# Patient Record
Sex: FEMALE | Race: WHITE | NOT HISPANIC OR LATINO
[De-identification: names, ages, dates, MRNs, and addresses within clinical notes are randomized per-mention and may not be internally consistent; named-entity substitution may affect disease eponyms.]

---

## 2019-01-25 PROBLEM — Z00.00 ENCOUNTER FOR PREVENTIVE HEALTH EXAMINATION: Status: ACTIVE | Noted: 2019-01-25

## 2019-03-04 ENCOUNTER — APPOINTMENT (OUTPATIENT)
Age: 69
End: 2019-03-04

## 2019-03-25 ENCOUNTER — EMERGENCY (EMERGENCY)
Facility: HOSPITAL | Age: 69
LOS: 0 days | Discharge: HOME | End: 2019-03-25
Attending: EMERGENCY MEDICINE | Admitting: EMERGENCY MEDICINE

## 2019-03-25 VITALS
TEMPERATURE: 97 F | OXYGEN SATURATION: 96 % | HEART RATE: 73 BPM | DIASTOLIC BLOOD PRESSURE: 80 MMHG | SYSTOLIC BLOOD PRESSURE: 129 MMHG | RESPIRATION RATE: 20 BRPM

## 2019-03-25 DIAGNOSIS — R05 COUGH: ICD-10-CM

## 2019-03-25 DIAGNOSIS — E86.0 DEHYDRATION: ICD-10-CM

## 2019-03-25 DIAGNOSIS — R53.1 WEAKNESS: ICD-10-CM

## 2019-03-25 DIAGNOSIS — M79.10 MYALGIA, UNSPECIFIED SITE: ICD-10-CM

## 2019-03-25 LAB
ALBUMIN SERPL ELPH-MCNC: 4 G/DL — SIGNIFICANT CHANGE UP (ref 3.5–5.2)
ALP SERPL-CCNC: 102 U/L — SIGNIFICANT CHANGE UP (ref 30–115)
ALT FLD-CCNC: 11 U/L — SIGNIFICANT CHANGE UP (ref 0–41)
ANION GAP SERPL CALC-SCNC: 13 MMOL/L — SIGNIFICANT CHANGE UP (ref 7–14)
AST SERPL-CCNC: 14 U/L — SIGNIFICANT CHANGE UP (ref 0–41)
BASOPHILS # BLD AUTO: 0.03 K/UL — SIGNIFICANT CHANGE UP (ref 0–0.2)
BASOPHILS NFR BLD AUTO: 0.5 % — SIGNIFICANT CHANGE UP (ref 0–1)
BILIRUB SERPL-MCNC: 0.4 MG/DL — SIGNIFICANT CHANGE UP (ref 0.2–1.2)
BUN SERPL-MCNC: 26 MG/DL — HIGH (ref 10–20)
CALCIUM SERPL-MCNC: 9.5 MG/DL — SIGNIFICANT CHANGE UP (ref 8.5–10.1)
CHLORIDE SERPL-SCNC: 104 MMOL/L — SIGNIFICANT CHANGE UP (ref 98–110)
CO2 SERPL-SCNC: 23 MMOL/L — SIGNIFICANT CHANGE UP (ref 17–32)
CREAT SERPL-MCNC: 0.9 MG/DL — SIGNIFICANT CHANGE UP (ref 0.7–1.5)
EOSINOPHIL # BLD AUTO: 0.11 K/UL — SIGNIFICANT CHANGE UP (ref 0–0.7)
EOSINOPHIL NFR BLD AUTO: 1.8 % — SIGNIFICANT CHANGE UP (ref 0–8)
GLUCOSE SERPL-MCNC: 88 MG/DL — SIGNIFICANT CHANGE UP (ref 70–99)
HCT VFR BLD CALC: 39.6 % — SIGNIFICANT CHANGE UP (ref 37–47)
HGB BLD-MCNC: 13.2 G/DL — SIGNIFICANT CHANGE UP (ref 12–16)
IMM GRANULOCYTES NFR BLD AUTO: 0.2 % — SIGNIFICANT CHANGE UP (ref 0.1–0.3)
LACTATE SERPL-SCNC: 0.8 MMOL/L — SIGNIFICANT CHANGE UP (ref 0.5–2.2)
LIDOCAIN IGE QN: 54 U/L — SIGNIFICANT CHANGE UP (ref 7–60)
LYMPHOCYTES # BLD AUTO: 2.79 K/UL — SIGNIFICANT CHANGE UP (ref 1.2–3.4)
LYMPHOCYTES # BLD AUTO: 44.7 % — SIGNIFICANT CHANGE UP (ref 20.5–51.1)
MAGNESIUM SERPL-MCNC: 2.4 MG/DL — SIGNIFICANT CHANGE UP (ref 1.8–2.4)
MCHC RBC-ENTMCNC: 30.8 PG — SIGNIFICANT CHANGE UP (ref 27–31)
MCHC RBC-ENTMCNC: 33.3 G/DL — SIGNIFICANT CHANGE UP (ref 32–37)
MCV RBC AUTO: 92.3 FL — SIGNIFICANT CHANGE UP (ref 81–99)
MONOCYTES # BLD AUTO: 0.5 K/UL — SIGNIFICANT CHANGE UP (ref 0.1–0.6)
MONOCYTES NFR BLD AUTO: 8 % — SIGNIFICANT CHANGE UP (ref 1.7–9.3)
NEUTROPHILS # BLD AUTO: 2.8 K/UL — SIGNIFICANT CHANGE UP (ref 1.4–6.5)
NEUTROPHILS NFR BLD AUTO: 44.8 % — SIGNIFICANT CHANGE UP (ref 42.2–75.2)
NRBC # BLD: 0 /100 WBCS — SIGNIFICANT CHANGE UP (ref 0–0)
PHOSPHATE SERPL-MCNC: 3.5 MG/DL — SIGNIFICANT CHANGE UP (ref 2.1–4.9)
PLATELET # BLD AUTO: 237 K/UL — SIGNIFICANT CHANGE UP (ref 130–400)
POTASSIUM SERPL-MCNC: 4.2 MMOL/L — SIGNIFICANT CHANGE UP (ref 3.5–5)
POTASSIUM SERPL-SCNC: 4.2 MMOL/L — SIGNIFICANT CHANGE UP (ref 3.5–5)
PROT SERPL-MCNC: 7.1 G/DL — SIGNIFICANT CHANGE UP (ref 6–8)
RBC # BLD: 4.29 M/UL — SIGNIFICANT CHANGE UP (ref 4.2–5.4)
RBC # FLD: 13.1 % — SIGNIFICANT CHANGE UP (ref 11.5–14.5)
SODIUM SERPL-SCNC: 140 MMOL/L — SIGNIFICANT CHANGE UP (ref 135–146)
TROPONIN T SERPL-MCNC: <0.01 NG/ML — SIGNIFICANT CHANGE UP
WBC # BLD: 6.24 K/UL — SIGNIFICANT CHANGE UP (ref 4.8–10.8)
WBC # FLD AUTO: 6.24 K/UL — SIGNIFICANT CHANGE UP (ref 4.8–10.8)

## 2019-03-25 RX ORDER — SODIUM CHLORIDE 9 MG/ML
1000 INJECTION INTRAMUSCULAR; INTRAVENOUS; SUBCUTANEOUS ONCE
Qty: 0 | Refills: 0 | Status: COMPLETED | OUTPATIENT
Start: 2019-03-25 | End: 2019-03-25

## 2019-03-25 RX ORDER — FAMOTIDINE 10 MG/ML
20 INJECTION INTRAVENOUS ONCE
Qty: 0 | Refills: 0 | Status: COMPLETED | OUTPATIENT
Start: 2019-03-25 | End: 2019-03-25

## 2019-03-25 RX ADMIN — FAMOTIDINE 20 MILLIGRAM(S): 10 INJECTION INTRAVENOUS at 19:46

## 2019-03-25 RX ADMIN — SODIUM CHLORIDE 2000 MILLILITER(S): 9 INJECTION INTRAMUSCULAR; INTRAVENOUS; SUBCUTANEOUS at 19:46

## 2019-03-25 NOTE — ED PROVIDER NOTE - OBJECTIVE STATEMENT
68 female here for feeling unwell over several days.     Symptoms started after taking antibiotics tetracycline for H. pylori infection found by breath test and EGD as outpatient.     Symptoms are non specific, include cough and malaise, nausea but no vomiting. Unrelenting, not worsening, and not associated with any constituional symptoms.

## 2019-03-25 NOTE — ED ADULT NURSE NOTE - NSIMPLEMENTINTERV_GEN_ALL_ED
Implemented All Universal Safety Interventions:  [place] to call system. Call bell, personal items and telephone within reach. Instruct patient to call for assistance. Room bathroom lighting operational. Non-slip footwear when patient is off stretcher. Physically safe environment: no spills, clutter or unnecessary equipment. Stretcher in lowest position, wheels locked, appropriate side rails in place.

## 2019-03-25 NOTE — ED PROVIDER NOTE - CLINICAL SUMMARY MEDICAL DECISION MAKING FREE TEXT BOX
68 female here for evaluation of weakness, malaise, cough.  On ABX for +  H pylori test / culture recently. Advised by PMD to stop ABX. No diarrhea. Had screening labs and imaging with plan for outpatient care. Pt improved clinically in ED with supportive care.

## 2020-03-17 NOTE — ED ADULT NURSE NOTE - LANGUAGE ASSISTANCE NEEDED
----- Message from Matti Garcia sent at 3/17/2020 10:13 AM CDT -----  Contact: Patient @ [phone]  Patient requesting a return call to discuss concerns with constipation, pls call    No-Patient/Caregiver offered and refused free interpretation services.

## 2020-03-31 ENCOUNTER — EMERGENCY (EMERGENCY)
Facility: HOSPITAL | Age: 70
LOS: 0 days | Discharge: HOME | End: 2020-03-31
Attending: EMERGENCY MEDICINE | Admitting: EMERGENCY MEDICINE
Payer: MEDICAID

## 2020-03-31 VITALS
SYSTOLIC BLOOD PRESSURE: 114 MMHG | RESPIRATION RATE: 20 BRPM | OXYGEN SATURATION: 95 % | DIASTOLIC BLOOD PRESSURE: 75 MMHG | HEART RATE: 96 BPM | TEMPERATURE: 100 F

## 2020-03-31 VITALS
TEMPERATURE: 101 F | DIASTOLIC BLOOD PRESSURE: 60 MMHG | HEART RATE: 96 BPM | RESPIRATION RATE: 19 BRPM | OXYGEN SATURATION: 95 % | SYSTOLIC BLOOD PRESSURE: 110 MMHG

## 2020-03-31 DIAGNOSIS — J18.9 PNEUMONIA, UNSPECIFIED ORGANISM: ICD-10-CM

## 2020-03-31 DIAGNOSIS — R50.9 FEVER, UNSPECIFIED: ICD-10-CM

## 2020-03-31 DIAGNOSIS — B34.9 VIRAL INFECTION, UNSPECIFIED: ICD-10-CM

## 2020-03-31 LAB
ALBUMIN SERPL ELPH-MCNC: 4.5 G/DL — SIGNIFICANT CHANGE UP (ref 3.5–5.2)
ALP SERPL-CCNC: 60 U/L — SIGNIFICANT CHANGE UP (ref 30–115)
ALT FLD-CCNC: 14 U/L — SIGNIFICANT CHANGE UP (ref 0–41)
ANION GAP SERPL CALC-SCNC: 12 MMOL/L — SIGNIFICANT CHANGE UP (ref 7–14)
AST SERPL-CCNC: 19 U/L — SIGNIFICANT CHANGE UP (ref 0–41)
BASOPHILS # BLD AUTO: 0.01 K/UL — SIGNIFICANT CHANGE UP (ref 0–0.2)
BASOPHILS NFR BLD AUTO: 0.2 % — SIGNIFICANT CHANGE UP (ref 0–1)
BILIRUB SERPL-MCNC: 0.2 MG/DL — SIGNIFICANT CHANGE UP (ref 0.2–1.2)
BUN SERPL-MCNC: 26 MG/DL — HIGH (ref 10–20)
CALCIUM SERPL-MCNC: 9.2 MG/DL — SIGNIFICANT CHANGE UP (ref 8.5–10.1)
CHLORIDE SERPL-SCNC: 96 MMOL/L — LOW (ref 98–110)
CO2 SERPL-SCNC: 26 MMOL/L — SIGNIFICANT CHANGE UP (ref 17–32)
CREAT SERPL-MCNC: 1.2 MG/DL — SIGNIFICANT CHANGE UP (ref 0.7–1.5)
EOSINOPHIL # BLD AUTO: 0 K/UL — SIGNIFICANT CHANGE UP (ref 0–0.7)
EOSINOPHIL NFR BLD AUTO: 0 % — SIGNIFICANT CHANGE UP (ref 0–8)
GLUCOSE SERPL-MCNC: 112 MG/DL — HIGH (ref 70–99)
HCT VFR BLD CALC: 41.5 % — SIGNIFICANT CHANGE UP (ref 37–47)
HGB BLD-MCNC: 13.7 G/DL — SIGNIFICANT CHANGE UP (ref 12–16)
IMM GRANULOCYTES NFR BLD AUTO: 0.3 % — SIGNIFICANT CHANGE UP (ref 0.1–0.3)
LYMPHOCYTES # BLD AUTO: 1.9 K/UL — SIGNIFICANT CHANGE UP (ref 1.2–3.4)
LYMPHOCYTES # BLD AUTO: 29.9 % — SIGNIFICANT CHANGE UP (ref 20.5–51.1)
MAGNESIUM SERPL-MCNC: 2.4 MG/DL — SIGNIFICANT CHANGE UP (ref 1.8–2.4)
MCHC RBC-ENTMCNC: 30.9 PG — SIGNIFICANT CHANGE UP (ref 27–31)
MCHC RBC-ENTMCNC: 33 G/DL — SIGNIFICANT CHANGE UP (ref 32–37)
MCV RBC AUTO: 93.7 FL — SIGNIFICANT CHANGE UP (ref 81–99)
MONOCYTES # BLD AUTO: 0.51 K/UL — SIGNIFICANT CHANGE UP (ref 0.1–0.6)
MONOCYTES NFR BLD AUTO: 8 % — SIGNIFICANT CHANGE UP (ref 1.7–9.3)
NEUTROPHILS # BLD AUTO: 3.91 K/UL — SIGNIFICANT CHANGE UP (ref 1.4–6.5)
NEUTROPHILS NFR BLD AUTO: 61.6 % — SIGNIFICANT CHANGE UP (ref 42.2–75.2)
NRBC # BLD: 0 /100 WBCS — SIGNIFICANT CHANGE UP (ref 0–0)
PLATELET # BLD AUTO: 229 K/UL — SIGNIFICANT CHANGE UP (ref 130–400)
POTASSIUM SERPL-MCNC: 5.3 MMOL/L — HIGH (ref 3.5–5)
POTASSIUM SERPL-SCNC: 5.3 MMOL/L — HIGH (ref 3.5–5)
PROT SERPL-MCNC: 8.1 G/DL — HIGH (ref 6–8)
RBC # BLD: 4.43 M/UL — SIGNIFICANT CHANGE UP (ref 4.2–5.4)
RBC # FLD: 13 % — SIGNIFICANT CHANGE UP (ref 11.5–14.5)
SODIUM SERPL-SCNC: 134 MMOL/L — LOW (ref 135–146)
TROPONIN T SERPL-MCNC: <0.01 NG/ML — SIGNIFICANT CHANGE UP
WBC # BLD: 6.35 K/UL — SIGNIFICANT CHANGE UP (ref 4.8–10.8)
WBC # FLD AUTO: 6.35 K/UL — SIGNIFICANT CHANGE UP (ref 4.8–10.8)

## 2020-03-31 PROCEDURE — 71045 X-RAY EXAM CHEST 1 VIEW: CPT | Mod: 26

## 2020-03-31 PROCEDURE — 93010 ELECTROCARDIOGRAM REPORT: CPT

## 2020-03-31 PROCEDURE — 99285 EMERGENCY DEPT VISIT HI MDM: CPT

## 2020-03-31 RX ORDER — ACETAMINOPHEN 500 MG
975 TABLET ORAL ONCE
Refills: 0 | Status: COMPLETED | OUTPATIENT
Start: 2020-03-31 | End: 2020-03-31

## 2020-03-31 RX ADMIN — Medication 975 MILLIGRAM(S): at 20:20

## 2020-03-31 NOTE — ED PROVIDER NOTE - NS ED ROS FT
Review of Systems  Constitutional:  (+) fever, chills.  Eyes:  No visual changes, eye pain, or discharge.  ENMT:  No hearing changes, pain, or discharge. No nasal congestion, discharge, or bleeding. No throat pain, swelling, or difficulty swallowing.  Cardiac:  No chest pain, palpitations, syncope, or edema.  Respiratory:  (+) cough, SOB. No hemoptysis.  GI:  No nausea, vomiting, diarrhea, or abdominal pain.   :  No dysuria, hematuria, frequency, or burning.   MS:  No back pain.  Skin:  No skin rash, pruritis, jaundice, or lesions.  Neuro:  No headache, dizziness, loss of sensation, or focal weakness.  No change in mental status.   Endocrine: No history of thyroid disease or diabetes.

## 2020-03-31 NOTE — ED PROVIDER NOTE - PHYSICAL EXAMINATION
VITAL SIGNS: I have reviewed nursing notes and confirm.  CONSTITUTIONAL: Well-developed; well-nourished; in no acute distress.  SKIN: Skin exam is warm and dry, no acute rash.  HEAD: Normocephalic; atraumatic.  EYES: Conjunctiva and sclera clear.  ENT: No nasal discharge; airway clear.   NECK: Supple; non tender.  CARD: S1, S2 normal; no murmurs, gallops, or rubs. Regular rate and rhythm.  RESP: No wheezes, rales or rhonchi. Speaking in full sentences.   ABD: Normal bowel sounds; soft; non-distended; non-tender; No rebound or guarding.  EXT: Normal ROM. No clubbing, cyanosis or edema.  NEURO: Alert, oriented. Grossly unremarkable. No focal deficits.

## 2020-03-31 NOTE — ED PROVIDER NOTE - NSFOLLOWUPINSTRUCTIONS_ED_ALL_ED_FT
You are being discharged with viral illness diagnosis and do not require hospitalization.  At this time, only patients who are being hospitalized are tested for COVID-19.    If you are well enough to be discharged home and are not in a high risk group to be admitted, you should care for yourself at home exactly like you would if you have Influenza “flu”. Follow all the standard guidelines about washing your hands, covering your cough, etc. If you feel unwell, stay home, rest and drink plenty of clear fluids. Keep track of your symptoms. You should return to the Emergency Department if you develop worse symptoms, trouble breathing, chest pain, and/or a fever that doesn’t improve with over the counter medications.    Please consider going through the drive-through testing unless you are severely ill and need to go to the ED.  -through testing is available at various location, including [place].  Call [facility] at  [phone] to make an appointment.    How to Set Up Your Home for Self-Quarantine or Self-Isolation    Please refer to this helpful video.   [email or web address]    Fever    A fever is an increase in the body's temperature above 100.4°F (38°C) or higher. In adults and children older than three months, a brief mild or moderate fever generally has no long-term effect, and it usually does not require treatment. Many times, fevers are the result of viral infections, which are self-resolving.  However, certain symptoms or diagnostic tests may suggest a bacterial infection that may respond to antibiotics. Take medications as directed by your health care provider.    SEEK IMMEDIATE MEDICAL CARE IF YOU OR YOUR CHILD HAVE ANY OF THE FOLLOWING SYMPTOMS : shortness of breath, seizure, rash/stiff neck/headache, severe abdominal pain, persistent vomiting, any signs of dehydration, or if your child has a fever for over five (5) days.    Cough    Coughing is a reflex that clears your throat and your airways. Coughing helps to heal and protect your lungs. It is normal to cough occasionally, but a cough that happens with other symptoms or lasts a long time may be a sign of a condition that needs treatment. Coughing may be caused by infections, asthma or COPD, smoking, postnasal drip, gastroesophageal reflux, as well as other medical conditions. Take medicines only as instructed by your health care provider. Avoid environments or triggers that causes you to cough at work or at home.    SEEK IMMEDIATE MEDICAL CARE IF YOU HAVE ANY OF THE FOLLOWING SYMPTOMS: coughing up blood, shortness of breath, rapid heart rate, chest pain, unexplained weight loss or night sweats.    Fever    A fever is an increase in the body's temperature above 100.4°F (38°C) or higher. In adults and children older than three months, a brief mild or moderate fever generally has no long-term effect, and it usually does not require treatment. Many times, fevers are the result of viral infections, which are self-resolving.  However, certain symptoms or diagnostic tests may suggest a bacterial infection that may respond to antibiotics. Take medications as directed by your health care provider.    SEEK IMMEDIATE MEDICAL CARE IF YOU OR YOUR CHILD HAVE ANY OF THE FOLLOWING SYMPTOMS : shortness of breath, seizure, rash/stiff neck/headache, severe abdominal pain, persistent vomiting, any signs of dehydration, or if your child has a fever for over five (5) days.    Pneumonia    Pneumonia is an infection of the lungs. Pneumonia may be caused by bacteria, viruses, or funguses. Symptoms include coughing, fever, chest pain when breathing deeply or coughing, shortness of breath, fatigue, or muscle aches. Pneumonia can be diagnosed with a medical history and physical exam, as well as other tests which may include a chest X-ray. If you were prescribed an antibiotic medicine, take it as told by your health care provider and do not stop taking the antibiotic even if you start to feel better. Do not use tobacco products, including cigarettes, chewing tobacco, and e-cigarettes.    SEEK IMMEDIATE MEDICAL CARE IF YOU HAVE ANY OF THE FOLLOWING SYMPTOMS: worsening shortness of breath, worsening chest pain, coughing up blood, change in mental status, lightheadedness/dizziness.

## 2020-03-31 NOTE — ED PROVIDER NOTE - ATTENDING CONTRIBUTION TO CARE
69 year old female, speaks Turkish, case discussed using translation services, comes in with uri symptoms, no cp, + mild exertional sob, no n/v/d, no loc, + subjective fever at home    CONSTITUTIONAL: Well-developed; well-nourished; in no acute distress. Sitting up and providing appropriate history and physical examination  SKIN: skin exam is warm and dry, no acute rash.  HEAD: Normocephalic; atraumatic.  EYES: PERRL, 3 mm bilateral, no nystagmus, EOM intact; conjunctiva and sclera clear.  ENT: + Pharyngeal erythema, no exudate, no edema, no pooling of secretions, No nasal discharge; airway clear.  NECK: Supple; non tender. + full passive ROM in all directions. No JVD  CARD: S1, S2 normal; no murmurs, gallops, or rubs. Regular rate and rhythm. + Symmetric Strong Pulses  RESP: No wheezes, rales or rhonchi. Good air movement bilaterally  ABD: soft; non-distended; non-tender. No Rebound, No Guarding, No signs of peritonitis, No CVA tenderness. No pulsatile abdominal mass. + Strong and Symmetric Pulses  EXT: Normal ROM. No clubbing, cyanosis or edema. Dp and Pt Pulses intact. Cap refill less than 3 seconds  NEURO: Alert, oriented, grossly unremarkable. No Focal deficits. GCS 15. NIH 0  PSYCH: Cooperative, appropriate.

## 2020-03-31 NOTE — ED PROVIDER NOTE - CLINICAL SUMMARY MEDICAL DECISION MAKING FREE TEXT BOX
I personally evaluated the patient. I reviewed the Resident’s or Physician Assistant’s note (as assigned above), and agree with the findings and plan except as documented in my note. case discussed with patient and daughter, would like to go home, given detailed return precautions, patient not tachypneic and not having labored breathing

## 2020-03-31 NOTE — ED PROVIDER NOTE - OBJECTIVE STATEMENT
70 yo F with PMHx of HTN, HLD, and GERD presents ot the ED c/o fever, chills, non-productive cough and mild SOB x 4 days. Pt presented today because SOB worsened slightly. Pt's family at home are sick at home with the same symptoms. Pt has been taking tylenol with good relief of fever. Pt denies aggravating factors. She denies other complaints. Pt denies nausea, vomiting, abdominal pain, diarrhea, headache, dizziness, weakness, chest pain, back pain, LOC, trauma, urinary symptoms, calf pain/swelling, recent travel, recent surgery.

## 2020-03-31 NOTE — ED PROVIDER NOTE - PATIENT PORTAL LINK FT
You can access the FollowMyHealth Patient Portal offered by [facility] by registering at the following website: [email or web address]. By joining [employer]’s FollowMyHealth portal, you will also be able to view your health information using other applications (apps) compatible with our system.

## 2020-04-01 ENCOUNTER — EMERGENCY (EMERGENCY)
Facility: HOSPITAL | Age: 70
LOS: 0 days | Discharge: HOME | End: 2020-04-02
Attending: EMERGENCY MEDICINE | Admitting: EMERGENCY MEDICINE
Payer: MEDICAID

## 2020-04-01 VITALS
SYSTOLIC BLOOD PRESSURE: 119 MMHG | RESPIRATION RATE: 20 BRPM | HEIGHT: 62 IN | TEMPERATURE: 103 F | DIASTOLIC BLOOD PRESSURE: 65 MMHG | HEART RATE: 99 BPM | WEIGHT: 160.06 LBS | OXYGEN SATURATION: 97 %

## 2020-04-01 DIAGNOSIS — U07.1 COVID-19: ICD-10-CM

## 2020-04-01 DIAGNOSIS — R50.9 FEVER, UNSPECIFIED: ICD-10-CM

## 2020-04-01 DIAGNOSIS — R11.2 NAUSEA WITH VOMITING, UNSPECIFIED: ICD-10-CM

## 2020-04-01 DIAGNOSIS — R06.02 SHORTNESS OF BREATH: ICD-10-CM

## 2020-04-01 DIAGNOSIS — R05 COUGH: ICD-10-CM

## 2020-04-01 DIAGNOSIS — R11.10 VOMITING, UNSPECIFIED: ICD-10-CM

## 2020-04-01 LAB
BASOPHILS # BLD AUTO: 0 K/UL — SIGNIFICANT CHANGE UP (ref 0–0.2)
BASOPHILS NFR BLD AUTO: 0 % — SIGNIFICANT CHANGE UP (ref 0–1)
EOSINOPHIL # BLD AUTO: 0 K/UL — SIGNIFICANT CHANGE UP (ref 0–0.7)
EOSINOPHIL NFR BLD AUTO: 0 % — SIGNIFICANT CHANGE UP (ref 0–8)
HCT VFR BLD CALC: 41.5 % — SIGNIFICANT CHANGE UP (ref 37–47)
HGB BLD-MCNC: 14.2 G/DL — SIGNIFICANT CHANGE UP (ref 12–16)
IMM GRANULOCYTES NFR BLD AUTO: 0.5 % — HIGH (ref 0.1–0.3)
LYMPHOCYTES # BLD AUTO: 0.92 K/UL — LOW (ref 1.2–3.4)
LYMPHOCYTES # BLD AUTO: 16.3 % — LOW (ref 20.5–51.1)
MCHC RBC-ENTMCNC: 32.3 PG — HIGH (ref 27–31)
MCHC RBC-ENTMCNC: 34.2 G/DL — SIGNIFICANT CHANGE UP (ref 32–37)
MCV RBC AUTO: 94.5 FL — SIGNIFICANT CHANGE UP (ref 81–99)
MONOCYTES # BLD AUTO: 0.44 K/UL — SIGNIFICANT CHANGE UP (ref 0.1–0.6)
MONOCYTES NFR BLD AUTO: 7.8 % — SIGNIFICANT CHANGE UP (ref 1.7–9.3)
NEUTROPHILS # BLD AUTO: 4.25 K/UL — SIGNIFICANT CHANGE UP (ref 1.4–6.5)
NEUTROPHILS NFR BLD AUTO: 75.4 % — HIGH (ref 42.2–75.2)
NRBC # BLD: 0 /100 WBCS — SIGNIFICANT CHANGE UP (ref 0–0)
PLATELET # BLD AUTO: 186 K/UL — SIGNIFICANT CHANGE UP (ref 130–400)
RBC # BLD: 4.39 M/UL — SIGNIFICANT CHANGE UP (ref 4.2–5.4)
RBC # FLD: 13 % — SIGNIFICANT CHANGE UP (ref 11.5–14.5)
SARS-COV-2 RNA SPEC QL NAA+PROBE: DETECTED
WBC # BLD: 5.64 K/UL — SIGNIFICANT CHANGE UP (ref 4.8–10.8)
WBC # FLD AUTO: 5.64 K/UL — SIGNIFICANT CHANGE UP (ref 4.8–10.8)

## 2020-04-01 PROCEDURE — 99285 EMERGENCY DEPT VISIT HI MDM: CPT

## 2020-04-01 RX ORDER — ACETAMINOPHEN 500 MG
650 TABLET ORAL ONCE
Refills: 0 | Status: COMPLETED | OUTPATIENT
Start: 2020-04-01 | End: 2020-04-01

## 2020-04-01 RX ORDER — ONDANSETRON 8 MG/1
4 TABLET, FILM COATED ORAL ONCE
Refills: 0 | Status: COMPLETED | OUTPATIENT
Start: 2020-04-01 | End: 2020-04-01

## 2020-04-01 RX ADMIN — Medication 650 MILLIGRAM(S): at 23:06

## 2020-04-01 RX ADMIN — ONDANSETRON 4 MILLIGRAM(S): 8 TABLET, FILM COATED ORAL at 23:06

## 2020-04-01 NOTE — ED PROVIDER NOTE - NSFOLLOWUPINSTRUCTIONS_ED_ALL_ED_FT
You are being discharged with viral illness diagnosis and do not require hospitalization.  At this time, only patients who are being hospitalized are tested for COVID-19.    If you are well enough to be discharged home and are not in a high risk group to be admitted, you should care for yourself at home exactly like you would if you have Influenza “flu”. Follow all the standard guidelines about washing your hands, covering your cough, etc. If you feel unwell, stay home, rest and drink plenty of clear fluids. Keep track of your symptoms. You should return to the Emergency Department if you develop worse symptoms, trouble breathing, chest pain, and/or a fever that doesn’t improve with over the counter medications.    Please consider going through the drive-through testing unless you are severely ill and need to go to the ED.  -through testing is available at various location, including [place].  Call [facility] at  [phone] to make an appointment.    How to Set Up Your Home for Self-Quarantine or Self-Isolation    Please refer to this helpful video.   [email or web address] You were seen today for symptoms due to coronavirus.    You should care for yourself at home exactly like you would if you have Influenza “flu”. Follow all the standard guidelines about washing your hands, covering your cough, etc. If you feel unwell, stay home, rest and drink plenty of clear fluids. Keep track of your symptoms. You should return to the Emergency Department if you develop worse symptoms, trouble breathing, chest pain, and/or a fever that doesn’t improve with over the counter medications.    How to Set Up Your Home for Self-Quarantine or Self-Isolation    Please refer to this helpful video.   [email or web address]

## 2020-04-01 NOTE — ED PROVIDER NOTE - ATTENDING CONTRIBUTION TO CARE
69F h/o htn, hl, gerd p/w viral/covid-like sx x 5d. Fever, cough, sob. +Sick contacts, other family members @ home. Seen in ED yest for same, covid+, cxr showing LLL infiltrate of viral v bacterial etiology, discharged to home on levaquin. Returned today for new-onset vomiting and continued sob. Febrile 103.5 in ED. No ha, neck pain or stiffness, sore throat, cp, abd pain, diarrhea, flank pain, urinary sx, rash.    PE:  nad  skin warm, dry  ncat  neck supple  borderline tachy 90s, nl s1s2 no mrg  ctab no wrr  abd soft ntnd no palpable masses no rgr  back non-tender no cvat  ext no cce dpi  neuro aaox3 grossly nf exam

## 2020-04-01 NOTE — ED ADULT NURSE NOTE - NSIMPLEMENTINTERV_GEN_ALL_ED
Implemented All Universal Safety Interventions:  [place] to call system. Call bell, personal items and telephone within reach. Instruct patient to call for assistance. Room bathroom lighting operational. Non-slip footwear when patient is off stretcher. Physically safe environment: no spills, clutter or unnecessary equipment. Stretcher in lowest position, wheels locked, appropriate side rails in place.

## 2020-04-01 NOTE — ED PROVIDER NOTE - NSFOLLOWUPCLINICS_GEN_ALL_ED_FT
[facility]  Medicine  [address], [place]   Phone: [phone]  Fax:   Follow Up Time: Routine [facility]  Medicine  [address], [place]   Phone: [phone]  Fax:   Follow Up Time: Routine

## 2020-04-01 NOTE — ED PROVIDER NOTE - NS ED ROS FT
Constitutional:  see HPI  Head:  no headache, dizziness, loss of consciousness  Eyes:  no visual changes; no eye pain, redness, or discharge  ENMT:  no ear pain or discharge; no hearing problems; no mouth or throat sores or lesions; no throat pain  Cardiac: no chest pain, tachycardia or palpitations  Respiratory: cough, sob  GI: nausea, vomiting; no abdominal pain  :  no dysuria, frequency, or burning with urination; no change in urine output  MS: no myalgias, muscle weakness, joint pain,or  injury; no joint swelling  Neuro: no weakness; no numbness or tingling; no seizure  Skin:  no rashes or color changes; no lacerations or abrasions

## 2020-04-01 NOTE — ED ADULT TRIAGE NOTE - CHIEF COMPLAINT QUOTE
Patient here as a revisit from yesterday for new onset vomiting. Denies cough and shortness of breath at this time.

## 2020-04-01 NOTE — ED PROVIDER NOTE - PATIENT PORTAL LINK FT
You can access the FollowMyHealth Patient Portal offered by [facility] by registering at the following website: [email or web address]. By joining [employer]’s FollowMyHealth portal, you will also be able to view your health information using other applications (apps) compatible with our system. You can access the FollowMyHealth Patient Portal offered by [facility] by registering at the following website: [email or web address]. By joining [employer]’s FollowMyHealth portal, you will also be able to view your health information using other applications (apps) compatible with our system.

## 2020-04-01 NOTE — ED PROVIDER NOTE - CLINICAL SUMMARY MEDICAL DECISION MAKING FREE TEXT BOX
vomiting, +covid dx yest as well as pulm infiltrate on cxr, viral v bacterial, d/c to home on levaquin - cxr similar/slightly improved v yest, ekg/labs wnl, tylenol/zofran given w/defervescence and pt tolerated po - pt saturating well both @ rest & w/exertion - all results d/w pt & copies given, strict return/iso precautions discussed, rec outpt

## 2020-04-01 NOTE — ED PROVIDER NOTE - OBJECTIVE STATEMENT
68 yo female with hx of htn, hld, gerd presenting with 5 days of fever, cough and worsening shortness of breath associated with nausea and vomiting. was seen yesterday with same symptoms but returning due to vomiting. son-in-law at home has similar symptoms. denies chest pain, abd pain, urinary symptoms, calf pain/swelling, recent long distance travel, hx of clots. 70 yo female with hx of htn, hld, gerd presenting with 5 days of fever, cough and worsening shortness of breath associated with nausea and vomiting. was seen yesterday and had LLL opacity and given levofloxacin, but returning due to vomiting. son-in-law at home has similar symptoms. denies chest pain, abd pain, urinary symptoms, calf pain/swelling, recent long distance travel, hx of clots.

## 2020-04-02 VITALS
TEMPERATURE: 101 F | RESPIRATION RATE: 20 BRPM | DIASTOLIC BLOOD PRESSURE: 62 MMHG | SYSTOLIC BLOOD PRESSURE: 128 MMHG | OXYGEN SATURATION: 98 % | HEART RATE: 65 BPM

## 2020-04-02 LAB
ALBUMIN SERPL ELPH-MCNC: 4.3 G/DL — SIGNIFICANT CHANGE UP (ref 3.5–5.2)
ALP SERPL-CCNC: 51 U/L — SIGNIFICANT CHANGE UP (ref 30–115)
ALT FLD-CCNC: 12 U/L — SIGNIFICANT CHANGE UP (ref 0–41)
ANION GAP SERPL CALC-SCNC: 16 MMOL/L — HIGH (ref 7–14)
AST SERPL-CCNC: 19 U/L — SIGNIFICANT CHANGE UP (ref 0–41)
BILIRUB SERPL-MCNC: 0.3 MG/DL — SIGNIFICANT CHANGE UP (ref 0.2–1.2)
BUN SERPL-MCNC: 30 MG/DL — HIGH (ref 10–20)
CALCIUM SERPL-MCNC: 9 MG/DL — SIGNIFICANT CHANGE UP (ref 8.5–10.1)
CHLORIDE SERPL-SCNC: 96 MMOL/L — LOW (ref 98–110)
CO2 SERPL-SCNC: 23 MMOL/L — SIGNIFICANT CHANGE UP (ref 17–32)
CREAT SERPL-MCNC: 1.5 MG/DL — SIGNIFICANT CHANGE UP (ref 0.7–1.5)
GLUCOSE SERPL-MCNC: 128 MG/DL — HIGH (ref 70–99)
LACTATE SERPL-SCNC: 1.2 MMOL/L — SIGNIFICANT CHANGE UP (ref 0.7–2)
LIDOCAIN IGE QN: 75 U/L — HIGH (ref 7–60)
POTASSIUM SERPL-MCNC: 5.2 MMOL/L — HIGH (ref 3.5–5)
POTASSIUM SERPL-SCNC: 5.2 MMOL/L — HIGH (ref 3.5–5)
PROT SERPL-MCNC: 7.7 G/DL — SIGNIFICANT CHANGE UP (ref 6–8)
SODIUM SERPL-SCNC: 135 MMOL/L — SIGNIFICANT CHANGE UP (ref 135–146)

## 2020-04-02 PROCEDURE — 71045 X-RAY EXAM CHEST 1 VIEW: CPT | Mod: 26

## 2020-04-02 PROCEDURE — 93010 ELECTROCARDIOGRAM REPORT: CPT

## 2020-04-02 NOTE — ED CLERICAL - NS ED CLERK NOTE PRE-ARRIVAL INFORMATION; ADDITIONAL PRE-ARRIVAL INFORMATION
This patient is enrolled in the COVID-19 Transitions program and has active care navigation. This patient can be followed up by the care navigation team within 24 hours. To arrange close follow-up or to obtain additional clinical information about this patient, please call the contact number above.

## 2020-04-06 ENCOUNTER — INPATIENT (INPATIENT)
Facility: HOSPITAL | Age: 70
LOS: 6 days | Discharge: HOME | End: 2020-04-13
Attending: HOSPITALIST | Admitting: HOSPITALIST
Payer: MEDICAID

## 2020-04-06 VITALS
HEIGHT: 62 IN | OXYGEN SATURATION: 89 % | SYSTOLIC BLOOD PRESSURE: 101 MMHG | RESPIRATION RATE: 25 BRPM | HEART RATE: 92 BPM | TEMPERATURE: 98 F | DIASTOLIC BLOOD PRESSURE: 56 MMHG

## 2020-04-06 DIAGNOSIS — R79.1 ABNORMAL COAGULATION PROFILE: ICD-10-CM

## 2020-04-06 DIAGNOSIS — R74.8 ABNORMAL LEVELS OF OTHER SERUM ENZYMES: ICD-10-CM

## 2020-04-06 DIAGNOSIS — I10 ESSENTIAL (PRIMARY) HYPERTENSION: ICD-10-CM

## 2020-04-06 DIAGNOSIS — R63.0 ANOREXIA: ICD-10-CM

## 2020-04-06 DIAGNOSIS — U07.1 COVID-19: ICD-10-CM

## 2020-04-06 DIAGNOSIS — E87.5 HYPERKALEMIA: ICD-10-CM

## 2020-04-06 DIAGNOSIS — D72.810 LYMPHOCYTOPENIA: ICD-10-CM

## 2020-04-06 DIAGNOSIS — R09.02 HYPOXEMIA: ICD-10-CM

## 2020-04-06 DIAGNOSIS — R63.8 OTHER SYMPTOMS AND SIGNS CONCERNING FOOD AND FLUID INTAKE: ICD-10-CM

## 2020-04-06 DIAGNOSIS — K21.9 GASTRO-ESOPHAGEAL REFLUX DISEASE WITHOUT ESOPHAGITIS: ICD-10-CM

## 2020-04-06 DIAGNOSIS — J12.89 OTHER VIRAL PNEUMONIA: ICD-10-CM

## 2020-04-06 DIAGNOSIS — E78.5 HYPERLIPIDEMIA, UNSPECIFIED: ICD-10-CM

## 2020-04-06 LAB
ALBUMIN SERPL ELPH-MCNC: 3.9 G/DL — SIGNIFICANT CHANGE UP (ref 3.5–5.2)
ALP SERPL-CCNC: 48 U/L — SIGNIFICANT CHANGE UP (ref 30–115)
ALT FLD-CCNC: 15 U/L — SIGNIFICANT CHANGE UP (ref 0–41)
ANION GAP SERPL CALC-SCNC: 15 MMOL/L — HIGH (ref 7–14)
AST SERPL-CCNC: 26 U/L — SIGNIFICANT CHANGE UP (ref 0–41)
BASE EXCESS BLDV CALC-SCNC: 2.3 MMOL/L — HIGH (ref -2–2)
BASOPHILS # BLD AUTO: 0.01 K/UL — SIGNIFICANT CHANGE UP (ref 0–0.2)
BASOPHILS NFR BLD AUTO: 0.1 % — SIGNIFICANT CHANGE UP (ref 0–1)
BILIRUB SERPL-MCNC: 0.3 MG/DL — SIGNIFICANT CHANGE UP (ref 0.2–1.2)
BUN SERPL-MCNC: 34 MG/DL — HIGH (ref 10–20)
CA-I SERPL-SCNC: 1.15 MMOL/L — SIGNIFICANT CHANGE UP (ref 1.12–1.3)
CALCIUM SERPL-MCNC: 9.1 MG/DL — SIGNIFICANT CHANGE UP (ref 8.5–10.1)
CHLORIDE SERPL-SCNC: 94 MMOL/L — LOW (ref 98–110)
CO2 SERPL-SCNC: 25 MMOL/L — SIGNIFICANT CHANGE UP (ref 17–32)
CREAT SERPL-MCNC: 1.3 MG/DL — SIGNIFICANT CHANGE UP (ref 0.7–1.5)
D DIMER BLD IA.RAPID-MCNC: 7015 NG/ML DDU — HIGH (ref 0–230)
EOSINOPHIL # BLD AUTO: 0 K/UL — SIGNIFICANT CHANGE UP (ref 0–0.7)
EOSINOPHIL NFR BLD AUTO: 0 % — SIGNIFICANT CHANGE UP (ref 0–8)
GAS PNL BLDV: 137 MMOL/L — SIGNIFICANT CHANGE UP (ref 136–145)
GAS PNL BLDV: SIGNIFICANT CHANGE UP
GLUCOSE SERPL-MCNC: 121 MG/DL — HIGH (ref 70–99)
HCO3 BLDV-SCNC: 27 MMOL/L — SIGNIFICANT CHANGE UP (ref 22–29)
HCT VFR BLD CALC: 41.3 % — SIGNIFICANT CHANGE UP (ref 37–47)
HCT VFR BLDA CALC: 42.2 % — SIGNIFICANT CHANGE UP (ref 34–44)
HGB BLD CALC-MCNC: 13.8 G/DL — LOW (ref 14–18)
HGB BLD-MCNC: 14.5 G/DL — SIGNIFICANT CHANGE UP (ref 12–16)
IMM GRANULOCYTES NFR BLD AUTO: 0.7 % — HIGH (ref 0.1–0.3)
LACTATE BLDV-MCNC: 1.7 MMOL/L — HIGH (ref 0.5–1.6)
LACTATE SERPL-SCNC: 1.8 MMOL/L — SIGNIFICANT CHANGE UP (ref 0.7–2)
LIDOCAIN IGE QN: 179 U/L — HIGH (ref 7–60)
LYMPHOCYTES # BLD AUTO: 0.93 K/UL — LOW (ref 1.2–3.4)
LYMPHOCYTES # BLD AUTO: 12.8 % — LOW (ref 20.5–51.1)
MCHC RBC-ENTMCNC: 32.8 PG — HIGH (ref 27–31)
MCHC RBC-ENTMCNC: 35.1 G/DL — SIGNIFICANT CHANGE UP (ref 32–37)
MCV RBC AUTO: 93.4 FL — SIGNIFICANT CHANGE UP (ref 81–99)
MONOCYTES # BLD AUTO: 0.41 K/UL — SIGNIFICANT CHANGE UP (ref 0.1–0.6)
MONOCYTES NFR BLD AUTO: 5.7 % — SIGNIFICANT CHANGE UP (ref 1.7–9.3)
NEUTROPHILS # BLD AUTO: 5.85 K/UL — SIGNIFICANT CHANGE UP (ref 1.4–6.5)
NEUTROPHILS NFR BLD AUTO: 80.7 % — HIGH (ref 42.2–75.2)
NRBC # BLD: 0 /100 WBCS — SIGNIFICANT CHANGE UP (ref 0–0)
PCO2 BLDV: 43 MMHG — SIGNIFICANT CHANGE UP (ref 41–51)
PH BLDV: 7.41 — SIGNIFICANT CHANGE UP (ref 7.26–7.43)
PLATELET # BLD AUTO: 212 K/UL — SIGNIFICANT CHANGE UP (ref 130–400)
PO2 BLDV: 17 MMHG — LOW (ref 20–40)
POTASSIUM BLDV-SCNC: 4.5 MMOL/L — SIGNIFICANT CHANGE UP (ref 3.3–5.6)
POTASSIUM SERPL-MCNC: 5.3 MMOL/L — HIGH (ref 3.5–5)
POTASSIUM SERPL-SCNC: 5.3 MMOL/L — HIGH (ref 3.5–5)
PROT SERPL-MCNC: 8 G/DL — SIGNIFICANT CHANGE UP (ref 6–8)
RBC # BLD: 4.42 M/UL — SIGNIFICANT CHANGE UP (ref 4.2–5.4)
RBC # FLD: 13 % — SIGNIFICANT CHANGE UP (ref 11.5–14.5)
SAO2 % BLDV: 22 % — SIGNIFICANT CHANGE UP
SODIUM SERPL-SCNC: 134 MMOL/L — LOW (ref 135–146)
TROPONIN T SERPL-MCNC: <0.01 NG/ML — SIGNIFICANT CHANGE UP
WBC # BLD: 7.25 K/UL — SIGNIFICANT CHANGE UP (ref 4.8–10.8)
WBC # FLD AUTO: 7.25 K/UL — SIGNIFICANT CHANGE UP (ref 4.8–10.8)

## 2020-04-06 PROCEDURE — 93010 ELECTROCARDIOGRAM REPORT: CPT

## 2020-04-06 PROCEDURE — 71045 X-RAY EXAM CHEST 1 VIEW: CPT | Mod: 26

## 2020-04-06 PROCEDURE — 99223 1ST HOSP IP/OBS HIGH 75: CPT

## 2020-04-06 PROCEDURE — 74177 CT ABD & PELVIS W/CONTRAST: CPT | Mod: 26

## 2020-04-06 PROCEDURE — 99285 EMERGENCY DEPT VISIT HI MDM: CPT

## 2020-04-06 RX ORDER — HYDROXYCHLOROQUINE SULFATE 200 MG
TABLET ORAL
Refills: 0 | Status: DISCONTINUED | OUTPATIENT
Start: 2020-04-06 | End: 2020-04-13

## 2020-04-06 RX ORDER — HYDROXYCHLOROQUINE SULFATE 200 MG
400 TABLET ORAL EVERY 12 HOURS
Refills: 0 | Status: COMPLETED | OUTPATIENT
Start: 2020-04-06 | End: 2020-04-07

## 2020-04-06 RX ORDER — CHLORHEXIDINE GLUCONATE 213 G/1000ML
1 SOLUTION TOPICAL
Refills: 0 | Status: DISCONTINUED | OUTPATIENT
Start: 2020-04-06 | End: 2020-04-13

## 2020-04-06 RX ORDER — AZITHROMYCIN 500 MG/1
500 TABLET, FILM COATED ORAL ONCE
Refills: 0 | Status: COMPLETED | OUTPATIENT
Start: 2020-04-06 | End: 2020-04-06

## 2020-04-06 RX ORDER — LISINOPRIL 2.5 MG/1
20 TABLET ORAL DAILY
Refills: 0 | Status: DISCONTINUED | OUTPATIENT
Start: 2020-04-06 | End: 2020-04-07

## 2020-04-06 RX ORDER — HYDROXYCHLOROQUINE SULFATE 200 MG
200 TABLET ORAL EVERY 12 HOURS
Refills: 0 | Status: DISCONTINUED | OUTPATIENT
Start: 2020-04-07 | End: 2020-04-13

## 2020-04-06 RX ORDER — ENOXAPARIN SODIUM 100 MG/ML
40 INJECTION SUBCUTANEOUS
Refills: 0 | Status: DISCONTINUED | OUTPATIENT
Start: 2020-04-06 | End: 2020-04-07

## 2020-04-06 RX ORDER — ATORVASTATIN CALCIUM 80 MG/1
20 TABLET, FILM COATED ORAL AT BEDTIME
Refills: 0 | Status: DISCONTINUED | OUTPATIENT
Start: 2020-04-06 | End: 2020-04-13

## 2020-04-06 RX ORDER — ACETAMINOPHEN 500 MG
975 TABLET ORAL ONCE
Refills: 0 | Status: COMPLETED | OUTPATIENT
Start: 2020-04-06 | End: 2020-04-06

## 2020-04-06 RX ADMIN — Medication 975 MILLIGRAM(S): at 11:00

## 2020-04-06 RX ADMIN — ATORVASTATIN CALCIUM 20 MILLIGRAM(S): 80 TABLET, FILM COATED ORAL at 21:21

## 2020-04-06 RX ADMIN — AZITHROMYCIN 255 MILLIGRAM(S): 500 TABLET, FILM COATED ORAL at 13:00

## 2020-04-06 RX ADMIN — Medication 400 MILLIGRAM(S): at 16:16

## 2020-04-06 RX ADMIN — AZITHROMYCIN 500 MILLIGRAM(S): 500 TABLET, FILM COATED ORAL at 14:30

## 2020-04-06 RX ADMIN — ENOXAPARIN SODIUM 40 MILLIGRAM(S): 100 INJECTION SUBCUTANEOUS at 17:46

## 2020-04-06 NOTE — H&P ADULT - HISTORY OF PRESENT ILLNESS
70 yo female with hx of htn, hld, gerd, covid + 3/31 previously presented for progressing weakness for the last few days. She was seen in ED x2 and LLL opacity on cxr and given levaquin x5 days, initially presented with sob, cough, fever, nausea, vomiting for 10 days, cough has resolved but continues to have weakness, nausea, vomiting and now mild RLQ abdominal pain for the past 2 days. She denies cough, abdominal pain, sob  chills, cp, urinary symptoms. [place]ian speaking. 68 yo female with hx of htn, hld, gerd, covid + 3/31 presented for progressing weakness and anorexia  for the last few days. She was seen in ED x2 and LLL opacity on cxr and given levaquin x5 days, initially presented with sob, cough, fever, nausea, vomiting for 10 days, cough has resolved but continues to have weakness, nausea, vomiting and now mild RLQ abdominal pain for the past 2 days. She denies cough, abdominal pain, sob  chills, cp, urinary symptoms. Welsh speaking.    IN ED  T(C): 38.9 ,HR: 92,BP: 101/56,RR: 20,SpO2: 96% ON 2L NC

## 2020-04-06 NOTE — ED PROVIDER NOTE - OBJECTIVE STATEMENT
70 yo female with hx of htn, hld, gerd, covid + 3/31 previously seen in ED x2 and LLL opacity on cxr and given levaquin x5 days presenting for sob, mild RLQ abdominal pain, nausea and vomiting. otherwise denies fever, cough, cp, sob, urinary symptoms. 70 yo female with hx of htn, hld, gerd, covid + 3/31 previously seen in ED x2 and LLL opacity on cxr and given levaquin x5 days initially presented with sob, cough, fever, nausea, vomiting for 10 days, cough has resolved but continues to have persistent sob, nausea, vomiting and now mild RLQ abdominal pain for the past 2 days. denies cp, urinary symptoms. Pt denies fever but has been taking tylenol daily, was found to have 102 fever in ED. 70 yo female with hx of htn, hld, gerd, covid + 3/31 previously seen in ED x2 and LLL opacity on cxr and given levaquin x5 days initially presented with sob, cough, fever, nausea, vomiting for 10 days, cough has resolved but continues to have persistent sob, nausea, vomiting and now mild RLQ abdominal pain for the past 2 days. denies cp, urinary symptoms. Pt denies fever but has been taking tylenol daily, was found to have 102 fever in ED. Pashto speaking.

## 2020-04-06 NOTE — ED PROVIDER NOTE - NS ED ROS FT
Constitutional:  see HPI  Head:  no headache, dizziness, loss of consciousness  Eyes:  no visual changes; no eye pain, redness, or discharge  ENMT:  no ear pain or discharge; no hearing problems; no mouth or throat sores or lesions; no throat pain  Cardiac: no chest pain, tachycardia or palpitations  Respiratory: no cough, wheezing, shortness of breath, chest tightness, or trouble breathing  GI: nausea, vomiting, abdominal pain  :  no dysuria, frequency, or burning with urination; no change in urine output  MS: no myalgias, muscle weakness, joint pain,or  injury; no joint swelling  Neuro: no weakness; no numbness or tingling; no seizure  Skin:  no rashes or color changes; no lacerations or abrasions

## 2020-04-06 NOTE — ED ADULT NURSE NOTE - INTERVENTIONS DEFINITIONS
Non-slip footwear when patient is off stretcher/[place] to call system/Instruct patient to call for assistance

## 2020-04-06 NOTE — ED PROVIDER NOTE - ATTENDING CONTRIBUTION TO CARE
I personally evaluated the patient. I reviewed the Resident’s or Physician Assistant’s note (as assigned above), and agree with the findings and plan except as documented in my note.     69 female here for COVID concerns. Complains of fever, cough, constitutional symptoms, RLQ pain.  Comorbidities include age.     Presents not hypoxic in the ED on room air.     ROS otherwise unremarkable    PE: female in no distress. CV: pulses intact. CHEST: normal work of breathing. no accessory muscle use. speech intact. ABD: nondistended. no rebound no guarding no masses. non distended. SKIN: normal. EXT: FROM. NEURO: AAO 3 no focal deficits. HEENT: EOMI, mask in place    Impression: viral syndrome, COVID19     Plan: IV labs imaging supportive care and reevaluation

## 2020-04-06 NOTE — H&P ADULT - ASSESSMENT
70 yo female with hx of htn, hld, gerd, covid + 3/31 previously presented for progressing weakness for the last few days        # COVID positive on 3/31, weakness   - Pt  clinically stable at this time. Currently satting 96% on 2L NC.  - CXR w/ bilateral peripheral interstitial infiltrate consistent w/ viral PNA  - Labwork significant for lymphopenia, D-dimer 7015  - Covid-19 PCR sent. C/w airborne and contact isolation  - C/w supplemntal O2. Maintain SaO2>92%  - CRP and Procalcitonin ordered for AM  - Start Plaquenil 400mg q12hr loading dose. C/w [id number] x4 days.   - QTc :  479 .      #elevated lipase:  - will repeat  for tomorrow   - pt is      #HTN:   - cw lisinopril    # DLD:   -cw atorvastain       #DIET:DASH  #DVT ppx: lovenox  # Gi ppx: not needed  # full code 70 yo female with hx of htn, hld, gerd, covid + 3/31 presented for progressing weakness and anorexia for the last few days        # COVID positive on 3/31, weakness   - Pt  clinically stable at this time. Currently satting 96% on 2L NC.  - CT showed consolidations within the bilateral lower lobes   - Labwork significant for lymphopenia, D-dimer 7015  - Covid-19 PCR sent. C/w airborne and contact isolation  - C/w supplemntal O2. Maintain SaO2>92%  - f/u CRP and Procalcitonin, ferritin  - Start Plaquenil 400mg q12hr loading dose. C/w [id number] x4 days.   - QTc :  479 .      #elevated lipase:  - CT abd : no sign of pancreatitis   - will repeat  for tomorrow   - pt is  asymptomatic now     #HTN:   - cw lisinopril    # DLD:   -cw atorvastain       #DIET:DASH  #DVT ppx: lovenox  # GI ppx: not needed  # full code

## 2020-04-06 NOTE — ED PROVIDER NOTE - PHYSICAL EXAMINATION
CONSTITUTIONAL: Well-developed; well-nourished  SKIN: warm, dry  HEAD: Normocephalic; atraumatic.  EYES: PERRL, EOMI, normal sclera and conjunctiva   ENT: No nasal discharge; airway clear.  NECK: Supple; non tender.  CARD: S1, S2 normal; no murmurs, gallops, or rubs. Regular rate and rhythm.   RESP: No wheezes, rales or rhonchi.  ABD: soft nd, mild tenderness RLQ  EXT: Normal ROM.  No clubbing, cyanosis or edema.   LYMPH: No acute cervical adenopathy.  NEURO: Alert, oriented, grossly unremarkable  PSYCH: Cooperative, appropriate.

## 2020-04-06 NOTE — ED PROVIDER NOTE - CLINICAL SUMMARY MEDICAL DECISION MAKING FREE TEXT BOX
69 female here for cough, fever and constitutional symptoms in the setting of known COVID19 infection. Several ED visits with discharge to home, not improving. Patient is not hypoxic.  Had screening labs imaging medications and reevaluation, concern for worsening COVID19, isolation precautions taken, plan is for inpatient admission for continued management for worsening chest xray findings and increased symptomatology as failed outpatient management. .

## 2020-04-06 NOTE — H&P ADULT - NSHPLABSRESULTS_GEN_ALL_CORE
14.5   7.25  )-----------( 212      ( 06 Apr 2020 11:01 )             41.3       04-06    134<L>  |  94<L>  |  34<H>  ----------------------------<  121<H>  5.3<H>   |  25  |  1.3    Ca    9.1      06 Apr 2020 11:01    TPro  8.0  /  Alb  3.9  /  TBili  0.3  /  DBili  x   /  AST  26  /  ALT  15  /  AlkPhos  48  04-06                Lactate Trend  04-06 @ 11:01 Lactate:1.8   04-01 @ 23:18 Lactate:1.2       CARDIAC MARKERS ( 06 Apr 2020 11:01 )  x     / <0.01 ng/mL / x     / x     / x            < from: CT Abdomen and Pelvis w/ IV Cont (04.06.20 @ 14:09) >    IMPRESSION:   1.  No CT evidence of acute abdominopelvic pathology.  2.  Atelectasis/airspace consolidations within the bilateral lower lobes. Consider superimposed infectious process in the appropriate clinical setting.    < end of copied text >    < from: Xray Chest 1 View AP/PA (04.06.20 @ 11:10) >    Findings:  Support devices: Telemetry leads.  Cardiac/mediastinum/hilum: Unremarkable.  Lung parenchyma/Pleura: Increasing peripheral patchy opacities at the mid to lower lung fields. No pleural effusion or pneumothorax.  Skeleton/soft tissues: Unremarkable.  Impression: Increased peripheral mid to lower lung field opacity since prior. Findings are compatible with pneumonia in the proper clinical setting    < end of copied text >

## 2020-04-06 NOTE — H&P ADULT - ATTENDING COMMENTS
Agree with above note.   Viral pneumonia secondary to the SARS COVID - 19 with bilateral lung opacity with new consolidation now.   High D-dimer - Lovenox started. therapeutic.   ID evaluation.   On Hydroxy already - No need for QTC monitoring now. .   HTN/ HLP- Home meds

## 2020-04-06 NOTE — ED ADULT TRIAGE NOTE - CHIEF COMPLAINT QUOTE
as per ems pt tested positive for corona virus and is feeling sob. unable to obtain oral temp pt trying to vomit on RN

## 2020-04-06 NOTE — H&P ADULT - NSHPPHYSICALEXAM_GEN_ALL_CORE
CONSTITUTIONAL: Well-developed; well-nourished  	SKIN: warm, dry  	HEAD: Normocephalic; atraumatic.  	EYES: PERRL, EOMI, normal sclera and conjunctiva   	ENT: No nasal discharge; airway clear.  	NECK: Supple; non tender.  	CARD: S1, S2 normal; no murmurs, gallops, or rubs. Regular rate and rhythm.   	RESP: No wheezes, rales or rhonchi.  	ABD: soft nd, mild tenderness RLQ  	EXT: Normal ROM.  No clubbing, cyanosis or edema.   	LYMPH: No acute cervical adenopathy.  	NEURO: Alert, oriented, grossly unremarkable  PSYCH: Cooperative, appropriate

## 2020-04-07 LAB
ALBUMIN SERPL ELPH-MCNC: 3.4 G/DL — LOW (ref 3.5–5.2)
ALP SERPL-CCNC: 43 U/L — SIGNIFICANT CHANGE UP (ref 30–115)
ALT FLD-CCNC: 15 U/L — SIGNIFICANT CHANGE UP (ref 0–41)
AMYLASE P1 CFR SERPL: 150 U/L — HIGH (ref 25–115)
ANION GAP SERPL CALC-SCNC: 16 MMOL/L — HIGH (ref 7–14)
AST SERPL-CCNC: 26 U/L — SIGNIFICANT CHANGE UP (ref 0–41)
BASOPHILS # BLD AUTO: 0 K/UL — SIGNIFICANT CHANGE UP (ref 0–0.2)
BASOPHILS NFR BLD AUTO: 0 % — SIGNIFICANT CHANGE UP (ref 0–1)
BILIRUB SERPL-MCNC: 0.3 MG/DL — SIGNIFICANT CHANGE UP (ref 0.2–1.2)
BUN SERPL-MCNC: 24 MG/DL — HIGH (ref 10–20)
CALCIUM SERPL-MCNC: 8.5 MG/DL — SIGNIFICANT CHANGE UP (ref 8.5–10.1)
CHLORIDE SERPL-SCNC: 97 MMOL/L — LOW (ref 98–110)
CO2 SERPL-SCNC: 22 MMOL/L — SIGNIFICANT CHANGE UP (ref 17–32)
CREAT SERPL-MCNC: 1 MG/DL — SIGNIFICANT CHANGE UP (ref 0.7–1.5)
CRP SERPL-MCNC: 20.95 MG/DL — HIGH (ref 0–0.4)
EOSINOPHIL # BLD AUTO: 0 K/UL — SIGNIFICANT CHANGE UP (ref 0–0.7)
EOSINOPHIL NFR BLD AUTO: 0 % — SIGNIFICANT CHANGE UP (ref 0–8)
FERRITIN SERPL-MCNC: 972 NG/ML — HIGH (ref 15–150)
GLUCOSE SERPL-MCNC: 120 MG/DL — HIGH (ref 70–99)
HCT VFR BLD CALC: 36.4 % — LOW (ref 37–47)
HCV AB S/CO SERPL IA: 0.03 COI — SIGNIFICANT CHANGE UP
HCV AB SERPL-IMP: SIGNIFICANT CHANGE UP
HGB BLD-MCNC: 12.7 G/DL — SIGNIFICANT CHANGE UP (ref 12–16)
IMM GRANULOCYTES NFR BLD AUTO: 0.5 % — HIGH (ref 0.1–0.3)
LIDOCAIN IGE QN: 186 U/L — HIGH (ref 7–60)
LYMPHOCYTES # BLD AUTO: 0.8 K/UL — LOW (ref 1.2–3.4)
LYMPHOCYTES # BLD AUTO: 10.2 % — LOW (ref 20.5–51.1)
MCHC RBC-ENTMCNC: 32.4 PG — HIGH (ref 27–31)
MCHC RBC-ENTMCNC: 34.9 G/DL — SIGNIFICANT CHANGE UP (ref 32–37)
MCV RBC AUTO: 92.9 FL — SIGNIFICANT CHANGE UP (ref 81–99)
MONOCYTES # BLD AUTO: 0.42 K/UL — SIGNIFICANT CHANGE UP (ref 0.1–0.6)
MONOCYTES NFR BLD AUTO: 5.3 % — SIGNIFICANT CHANGE UP (ref 1.7–9.3)
NEUTROPHILS # BLD AUTO: 6.61 K/UL — HIGH (ref 1.4–6.5)
NEUTROPHILS NFR BLD AUTO: 84 % — HIGH (ref 42.2–75.2)
NRBC # BLD: 0 /100 WBCS — SIGNIFICANT CHANGE UP (ref 0–0)
PLATELET # BLD AUTO: 219 K/UL — SIGNIFICANT CHANGE UP (ref 130–400)
POTASSIUM SERPL-MCNC: 4.3 MMOL/L — SIGNIFICANT CHANGE UP (ref 3.5–5)
POTASSIUM SERPL-SCNC: 4.3 MMOL/L — SIGNIFICANT CHANGE UP (ref 3.5–5)
PROCALCITONIN SERPL-MCNC: 0.12 NG/ML — HIGH (ref 0.02–0.1)
PROT SERPL-MCNC: 7.1 G/DL — SIGNIFICANT CHANGE UP (ref 6–8)
RBC # BLD: 3.92 M/UL — LOW (ref 4.2–5.4)
RBC # FLD: 13.1 % — SIGNIFICANT CHANGE UP (ref 11.5–14.5)
SODIUM SERPL-SCNC: 135 MMOL/L — SIGNIFICANT CHANGE UP (ref 135–146)
WBC # BLD: 7.87 K/UL — SIGNIFICANT CHANGE UP (ref 4.8–10.8)
WBC # FLD AUTO: 7.87 K/UL — SIGNIFICANT CHANGE UP (ref 4.8–10.8)

## 2020-04-07 PROCEDURE — 99233 SBSQ HOSP IP/OBS HIGH 50: CPT

## 2020-04-07 RX ORDER — ACETAMINOPHEN 500 MG
650 TABLET ORAL EVERY 6 HOURS
Refills: 0 | Status: DISCONTINUED | OUTPATIENT
Start: 2020-04-07 | End: 2020-04-13

## 2020-04-07 RX ORDER — ENOXAPARIN SODIUM 100 MG/ML
70 INJECTION SUBCUTANEOUS EVERY 12 HOURS
Refills: 0 | Status: DISCONTINUED | OUTPATIENT
Start: 2020-04-07 | End: 2020-04-13

## 2020-04-07 RX ORDER — ACETAMINOPHEN 500 MG
650 TABLET ORAL EVERY 6 HOURS
Refills: 0 | Status: DISCONTINUED | OUTPATIENT
Start: 2020-04-07 | End: 2020-04-07

## 2020-04-07 RX ORDER — PANTOPRAZOLE SODIUM 20 MG/1
40 TABLET, DELAYED RELEASE ORAL DAILY
Refills: 0 | Status: DISCONTINUED | OUTPATIENT
Start: 2020-04-07 | End: 2020-04-13

## 2020-04-07 RX ORDER — METOCLOPRAMIDE HCL 10 MG
10 TABLET ORAL ONCE
Refills: 0 | Status: COMPLETED | OUTPATIENT
Start: 2020-04-07 | End: 2020-04-07

## 2020-04-07 RX ORDER — SODIUM CHLORIDE 9 MG/ML
1000 INJECTION, SOLUTION INTRAVENOUS
Refills: 0 | Status: DISCONTINUED | OUTPATIENT
Start: 2020-04-07 | End: 2020-04-11

## 2020-04-07 RX ORDER — AZITHROMYCIN 500 MG/1
500 TABLET, FILM COATED ORAL ONCE
Refills: 0 | Status: COMPLETED | OUTPATIENT
Start: 2020-04-07 | End: 2020-04-07

## 2020-04-07 RX ADMIN — Medication 400 MILLIGRAM(S): at 05:43

## 2020-04-07 RX ADMIN — SODIUM CHLORIDE 75 MILLILITER(S): 9 INJECTION, SOLUTION INTRAVENOUS at 15:39

## 2020-04-07 RX ADMIN — PANTOPRAZOLE SODIUM 40 MILLIGRAM(S): 20 TABLET, DELAYED RELEASE ORAL at 16:06

## 2020-04-07 RX ADMIN — ENOXAPARIN SODIUM 70 MILLIGRAM(S): 100 INJECTION SUBCUTANEOUS at 18:31

## 2020-04-07 RX ADMIN — Medication 650 MILLIGRAM(S): at 06:20

## 2020-04-07 RX ADMIN — Medication 10 MILLIGRAM(S): at 15:37

## 2020-04-07 RX ADMIN — Medication 650 MILLIGRAM(S): at 18:30

## 2020-04-07 RX ADMIN — AZITHROMYCIN 255 MILLIGRAM(S): 500 TABLET, FILM COATED ORAL at 09:31

## 2020-04-07 RX ADMIN — LISINOPRIL 20 MILLIGRAM(S): 2.5 TABLET ORAL at 05:43

## 2020-04-07 RX ADMIN — ENOXAPARIN SODIUM 40 MILLIGRAM(S): 100 INJECTION SUBCUTANEOUS at 05:43

## 2020-04-07 RX ADMIN — Medication 200 MILLIGRAM(S): at 15:38

## 2020-04-07 NOTE — PROGRESS NOTE ADULT - ATTENDING COMMENTS
Patient seen and examined independently. I agree with the resident's note, physical exam, and plan except as below.  Vital Signs Last 24 Hrs  T(C): 37.2 (07 Apr 2020 08:13), Max: 38.4 (07 Apr 2020 06:02)  T(F): 99 (07 Apr 2020 08:13), Max: 101.1 (07 Apr 2020 06:02)  HR: 76 (07 Apr 2020 08:13) (76 - 83)  BP: 92/65 (07 Apr 2020 08:13) (92/65 - 120/77)  BP(mean): --  RR: 20 (07 Apr 2020 08:13) (18 - 20)  SpO2: 97% (07 Apr 2020 08:13) (97% - 97%)  PE  nad  aaox3  [id number]  ctbl  softntnd+bs  nocce    #COVID PNA - worsening infiltrates - starte on HCQ - QTC acceptable  on 2L Nc - can likely be checked off O2  discussed with daughter over phone - main complaint for return to Er was uncontrolled vomiting , diarrhea - denies - abd pain  < from: CT Abdomen and Pelvis w/ IV Cont (04.06.20 @ 14:09) >    IMPRESSION:     1.  No CT evidence of acute abdominopelvic pathology.    2.  Ground glass and consolidative peripheral bilateral opacities, which may be seen with atypical infection including viral pneumonia.    amylase/lipase elevated but asymptomatic and no CT evidence of pancreatitis  will start IVfs  pt unable to tolerate po - reglan ordered - diet as tolerated  started on tx lovenox - adjust dose based on weight - Ddimer 7K  if Vomiting not resolved consider CTangio    #hyperkalemia - ACEI held    discussed with team and family on phone Patient seen and examined independently. I agree with the resident's note, physical exam, and plan except as below.  Vital Signs Last 24 Hrs  T(C): 37.2 (07 Apr 2020 08:13), Max: 38.4 (07 Apr 2020 06:02)  T(F): 99 (07 Apr 2020 08:13), Max: 101.1 (07 Apr 2020 06:02)  HR: 76 (07 Apr 2020 08:13) (76 - 83)  BP: 92/65 (07 Apr 2020 08:13) (92/65 - 120/77)  BP(mean): --  RR: 20 (07 Apr 2020 08:13) (18 - 20)  SpO2: 97% (07 Apr 2020 08:13) (97% - 97%)  PE  nad  aaox3  [id number]  ctbl  softntnd+bs  nocce    #COVID PNA - worsening infiltrates - starte on HCQ - QTC acceptable  on 2L Nc - can likely be checked off O2  discussed with daughter over phone - main complaint for return to Er was uncontrolled vomiting , diarrhea - denies - abd pain  < from: CT Abdomen and Pelvis w/ IV Cont (04.06.20 @ 14:09) >    IMPRESSION:     1.  No CT evidence of acute abdominopelvic pathology.    2.  Ground glass and consolidative peripheral bilateral opacities, which may be seen with atypical infection including viral pneumonia.    amylase/lipase elevated but asymptomatic and no CT evidence of pancreatitis  will start IVfs  pt unable to tolerate po - reglan ordered - diet as tolerated  started on tx lovenox - adjust dose based on weight - Ddimer 7K  if Vomiting not resolved consider CTangio, check lactate    #hyperkalemia - ACEI held    discussed with team and family on phone

## 2020-04-07 NOTE — PROGRESS NOTE ADULT - SUBJECTIVE AND OBJECTIVE BOX
SUBJECTIVE:    Patient is a 70 y/o Female who presents with a chief complaint of weakness (06 Apr 2020 14:57)    Currently admitted to medicine with the primary diagnosis of COVID-19.      Today is hospital day 1. Patient was seen and examined at bedside. No overnight events. On 97% on 2L nasal cannula.    PAST MEDICAL & SURGICAL HISTORY  PAST MEDICAL & SURGICAL HISTORY:  Chronic GERD  HLD (hyperlipidemia)    SOCIAL HISTORY:  no smoker  no alcohol or drug abuse  lives with daughter    ALLERGIES:  No Known Allergies    MEDICATIONS:  STANDING MEDICATIONS  atorvastatin 20 milliGRAM(s) Oral at bedtime  azithromycin  IVPB 500 milliGRAM(s) IV Intermittent once  chlorhexidine 4% Liquid 1 Application(s) Topical <User Schedule>  enoxaparin Injectable 40 milliGRAM(s) SubCutaneous two times a day  hydroxychloroquine   Oral   hydroxychloroquine 200 milliGRAM(s) Oral every 12 hours    PRN MEDICATIONS  acetaminophen   Tablet .. 650 milliGRAM(s) Oral every 6 hours PRN    VITALS:   T(F): 99  HR: 76  BP: 92/65  RR: 20  SpO2: 97%    LABS:                        12.7   7.87  )-----------( 219      ( 07 Apr 2020 07:21 )             36.4     04-07    135  |  97<L>  |  24<H>  ----------------------------<  120<H>  4.3   |  22  |  1.0    Ca    8.5      07 Apr 2020 07:21    TPro  7.1  /  Alb  3.4<L>  /  TBili  0.3  /  DBili  x   /  AST  26  /  ALT  15  /  AlkPhos  43  04-07          Lactate, Blood: 1.8 mmol/L (04-06-20 @ 11:01)  Troponin T, Serum: <0.01 ng/mL (04-06-20 @ 11:01)      CARDIAC MARKERS ( 06 Apr 2020 11:01 )  x     / <0.01 ng/mL / x     / x     / x          RADIOLOGY:  < from: CT Abdomen and Pelvis w/ IV Cont (04.06.20 @ 14:09) >    IMPRESSION:     1.  No CT evidence of acute abdominopelvic pathology.    2.  Ground glass and consolidative peripheral bilateral opacities, which may be seen with atypical infection including viral pneumonia.      < end of copied text >    < from: Xray Chest 1 View AP/PA (04.06.20 @ 11:10) >    Impression: Increased peripheral mid to lower lung field opacity since prior. Findings are compatible with pneumonia in the proper clinical setting      < end of copied text >        PHYSICAL EXAM:  GENERAL: Elderly F in NAD, speaks in full sentences, no signs of respiratory distress. On 2L NC  HEAD: Atraumatic  NECK: Supple  CHEST/LUNG: Clear to auscultation bilaterally; No wheeze or crackles  HEART: S1, S2; RRR; No murmurs, rubs, or gallops  ABDOMEN: BS+; Soft, Non-tender, Non-distended  EXTREMITIES:  2+ Peripheral Pulses, No clubbing, cyanosis, or edema  PSYCH: AAOx3  NEUROLOGY: non-focal  SKIN: No rashes or lesions

## 2020-04-07 NOTE — PROGRESS NOTE ADULT - ASSESSMENT
68 yo female with hx of htn, hld, gerd, covid + 3/31 presented for progressing weakness and anorexia for the last few days.     # COVID positive on 3/31, weakness   - Pt clinically stable at this time. Currently satting 97% on 2L NC.  - CT showed consolidations within the bilateral lower lobes   - Labwork significant for lymphopenia, D-dimer 7015  - C/w airborne and contact isolation. Limit healthcare professional contact  - C/w supplemntal O2. Maintain SaO2>92%  - f/u CRP and Procalcitonin, ferritin  - QTc: 479. Continue plaquenil.  - start therapeutic lovenox  - f/u ID eval    # Elevated lipase  - CT abd: no sign of pancreatitis   - pt is asymptomatic now     # HTN  - hold lisinopril for hyperkalemia. Switch to amlodipine if needed    # DLD  - cw atorvastain     DIET:DASH  DVT ppx: therapeutic lovenox  GI ppx: not needed  Dispo: Acute, from home  full code 68 yo female with hx of htn, hld, gerd, covid + 3/31 presented for progressing weakness and anorexia for the last few days.     # COVID positive on 3/31, weakness   - Pt clinically stable at this time. Currently satting 97% on 2L NC.  - CT showed consolidations within the bilateral lower lobes   - Labwork significant for lymphopenia, D-dimer 7015  - C/w airborne and contact isolation. Limit healthcare professional contact  - C/w supplemntal O2. Maintain SaO2>92%  - f/u CRP and Procalcitonin, ferritin  - QTc: 479. Continue plaquenil.  - start therapeutic lovenox  - f/u ID eval    # Elevated lipase  - CT abd: no sign of pancreatitis   - pt is asymptomatic now     # HTN  - hold lisinopril for hyperkalemia. Switch to amlodipine if needed    # DLD  - cw atorvastain     DIET:DASH  DVT ppx: therapeutic lovenox  GI ppx: not needed  Dispo: Acute, from home  full code     *** Called the daughter, Amber Fitch () on 4/7 and updated her on patients condition ***

## 2020-04-08 LAB
CRP SERPL-MCNC: 22.51 MG/DL — HIGH (ref 0–0.4)
LACTATE SERPL-SCNC: 0.9 MMOL/L — SIGNIFICANT CHANGE UP (ref 0.7–2)
PROCALCITONIN SERPL-MCNC: 0.14 NG/ML — HIGH (ref 0.02–0.1)

## 2020-04-08 PROCEDURE — 99233 SBSQ HOSP IP/OBS HIGH 50: CPT

## 2020-04-08 RX ORDER — ENOXAPARIN SODIUM 100 MG/ML
40 INJECTION SUBCUTANEOUS
Qty: 560 | Refills: 0
Start: 2020-04-08 | End: 2020-04-21

## 2020-04-08 RX ADMIN — Medication 200 MILLIGRAM(S): at 05:53

## 2020-04-08 RX ADMIN — ATORVASTATIN CALCIUM 20 MILLIGRAM(S): 80 TABLET, FILM COATED ORAL at 21:32

## 2020-04-08 RX ADMIN — Medication 200 MILLIGRAM(S): at 16:14

## 2020-04-08 RX ADMIN — SODIUM CHLORIDE 75 MILLILITER(S): 9 INJECTION, SOLUTION INTRAVENOUS at 21:47

## 2020-04-08 RX ADMIN — PANTOPRAZOLE SODIUM 40 MILLIGRAM(S): 20 TABLET, DELAYED RELEASE ORAL at 16:14

## 2020-04-08 RX ADMIN — ENOXAPARIN SODIUM 70 MILLIGRAM(S): 100 INJECTION SUBCUTANEOUS at 16:14

## 2020-04-08 RX ADMIN — ATORVASTATIN CALCIUM 20 MILLIGRAM(S): 80 TABLET, FILM COATED ORAL at 02:13

## 2020-04-08 RX ADMIN — SODIUM CHLORIDE 75 MILLILITER(S): 9 INJECTION, SOLUTION INTRAVENOUS at 05:56

## 2020-04-08 RX ADMIN — ENOXAPARIN SODIUM 70 MILLIGRAM(S): 100 INJECTION SUBCUTANEOUS at 06:08

## 2020-04-08 RX ADMIN — Medication 650 MILLIGRAM(S): at 16:30

## 2020-04-08 NOTE — PROGRESS NOTE ADULT - SUBJECTIVE AND OBJECTIVE BOX
SUBJECTIVE:    Patient is a 68 y/o Female who presents with a chief complaint of weakness (06 Apr 2020 14:57)    Currently admitted to medicine with the primary diagnosis of COVID-19.      Today is hospital day 2. Patient was seen and examined at bedside. No overnight events. On 97% on 2L nasal cannula.    PAST MEDICAL & SURGICAL HISTORY  PAST MEDICAL & SURGICAL HISTORY:  Chronic GERD  HLD (hyperlipidemia)    SOCIAL HISTORY:  no smoker  no alcohol or drug abuse  lives with daughter    ALLERGIES:  No Known Allergies    MEDICATIONS:  STANDING MEDICATIONS  atorvastatin 20 milliGRAM(s) Oral at bedtime  azithromycin  IVPB 500 milliGRAM(s) IV Intermittent once  chlorhexidine 4% Liquid 1 Application(s) Topical <User Schedule>  enoxaparin Injectable 40 milliGRAM(s) SubCutaneous two times a day  hydroxychloroquine   Oral   hydroxychloroquine 200 milliGRAM(s) Oral every 12 hours    PRN MEDICATIONS  acetaminophen   Tablet .. 650 milliGRAM(s) Oral every 6 hours PRN    VITALS:   T(F): 99  HR: 76  BP: 92/65  RR: 20  SpO2: 97%    LABS:                        12.7   7.87  )-----------( 219      ( 07 Apr 2020 07:21 )             36.4     04-07    135  |  97<L>  |  24<H>  ----------------------------<  120<H>  4.3   |  22  |  1.0    Ca    8.5      07 Apr 2020 07:21    TPro  7.1  /  Alb  3.4<L>  /  TBili  0.3  /  DBili  x   /  AST  26  /  ALT  15  /  AlkPhos  43  04-07          Lactate, Blood: 1.8 mmol/L (04-06-20 @ 11:01)  Troponin T, Serum: <0.01 ng/mL (04-06-20 @ 11:01)      CARDIAC MARKERS ( 06 Apr 2020 11:01 )  x     / <0.01 ng/mL / x     / x     / x          RADIOLOGY:  < from: CT Abdomen and Pelvis w/ IV Cont (04.06.20 @ 14:09) >    IMPRESSION:     1.  No CT evidence of acute abdominopelvic pathology.    2.  Ground glass and consolidative peripheral bilateral opacities, which may be seen with atypical infection including viral pneumonia.      < end of copied text >    < from: Xray Chest 1 View AP/PA (04.06.20 @ 11:10) >    Impression: Increased peripheral mid to lower lung field opacity since prior. Findings are compatible with pneumonia in the proper clinical setting      < end of copied text >        PHYSICAL EXAM:  GENERAL: Elderly F in NAD, speaks in full sentences, no signs of respiratory distress. On 2L NC  HEAD: Atraumatic  NECK: Supple  CHEST/LUNG: Clear to auscultation bilaterally; No wheeze or crackles  HEART: S1, S2; RRR; No murmurs, rubs, or gallops  ABDOMEN: BS+; Soft, Non-tender, Non-distended  EXTREMITIES:  2+ Peripheral Pulses, No clubbing, cyanosis, or edema  PSYCH: AAOx3  NEUROLOGY: non-focal  SKIN: No rashes or lesions SUBJECTIVE:    Patient is a 68 y/o Bel[place]ian-speaking Female who presents with a chief complaint of weakness (2020 14:57)    Currently admitted to medicine with the primary diagnosis of COVID-19.      Today is hospital day 2. Patient was seen and examined at bedside. No overnight events. De-sat to 86% on Room air, which improved to 92% on 2L NC. Still having decreased PO intake.     PAST MEDICAL & SURGICAL HISTORY  PAST MEDICAL & SURGICAL HISTORY:  Chronic GERD  HLD (hyperlipidemia)    SOCIAL HISTORY:  no smoker  no alcohol or drug abuse  lives with daughter    ALLERGIES:  No Known Allergies    MEDICATIONS:  MEDICATIONS  (STANDING):  atorvastatin 20 milliGRAM(s) Oral at bedtime  chlorhexidine 4% Liquid 1 Application(s) Topical <User Schedule>  dextrose 5% + sodium chloride 0.45%. 1000 milliLiter(s) (75 mL/Hr) IV Continuous <Continuous>  enoxaparin Injectable 70 milliGRAM(s) SubCutaneous every 12 hours  hydroxychloroquine   Oral   hydroxychloroquine 200 milliGRAM(s) Oral every 12 hours  pantoprazole  Injectable 40 milliGRAM(s) IV Push daily    MEDICATIONS  (PRN):  acetaminophen   Tablet .. 650 milliGRAM(s) Oral every 6 hours PRN Mild Pain (1 - 3)  acetaminophen   Tablet .. 650 milliGRAM(s) Oral every 6 hours PRN Temp greater or equal to 38C (100.4F)      VITALS:   Vital Signs (24 Hrs):  T(C): 37.3 (20 @ 08:45), Max: 38.1 (20 @ 15:59)  HR: 85 (20 @ 08:45) (72 - 94)  BP: 107/67 (20 @ 08:45) (107/67 - 148/66)  RR: 16 (20 @ 08:45) (16 - 18)  SpO2: 93% (20 @ 09:16) (86% - 100%)  Wt(kg): --  Daily     Daily Weight in k.7 (2020 15:33)    I&O's Summary      LABS:                        12.7   7.87  )-----------( 219      ( 2020 07:21 )             36.4     -    135  |  97<L>  |  24<H>  ----------------------------<  120<H>  4.3   |  22  |  1.0    Ca    8.5      2020 07:21    TPro  7.1  /  Alb  3.4<L>  /  TBili  0.3  /  DBili  x   /  AST  26  /  ALT  15  /  AlkPhos  43  04-07          Lactate, Blood: 1.8 mmol/L (20 @ 11:01)  Troponin T, Serum: <0.01 ng/mL (20 @ 11:01)      CARDIAC MARKERS ( 2020 11:01 )  x     / <0.01 ng/mL / x     / x     / x          RADIOLOGY:  < from: CT Abdomen and Pelvis w/ IV Cont (20 @ 14:09) >    IMPRESSION:     1.  No CT evidence of acute abdominopelvic pathology.    2.  Ground glass and consolidative peripheral bilateral opacities, which may be seen with atypical infection including viral pneumonia.      < end of copied text >    < from: Xray Chest 1 View AP/PA (20 @ 11:10) >    Impression: Increased peripheral mid to lower lung field opacity since prior. Findings are compatible with pneumonia in the proper clinical setting      < end of copied text >        PHYSICAL EXAM:  GENERAL: Elderly F in NAD, speaks in full sentences, no signs of respiratory distress. On 2L NC  HEAD: Atraumatic  NECK: Supple  CHEST/LUNG: Clear to auscultation bilaterally; No wheeze or crackles  HEART: S1, S2; RRR; No murmurs, rubs, or gallops  ABDOMEN: BS+; Soft, Non-tender, Non-distended  EXTREMITIES:  2+ Peripheral Pulses, No clubbing, cyanosis, or edema  PSYCH: AAOx3  NEUROLOGY: non-focal  SKIN: No rashes or lesions

## 2020-04-08 NOTE — PROGRESS NOTE ADULT - ATTENDING COMMENTS
Patient seen and examined independently. I agree with the resident's note, physical exam, and plan except as below.  Vital Signs Last 24 Hrs  T(C): 37.3 (08 Apr 2020 08:45), Max: 38.1 (07 Apr 2020 15:59)  T(F): 99.1 (08 Apr 2020 08:45), Max: 100.6 (07 Apr 2020 15:59)  HR: 85 (08 Apr 2020 08:45) (72 - 94)  BP: 107/67 (08 Apr 2020 08:45) (107/67 - 148/66)  BP(mean): --  RR: 16 (08 Apr 2020 08:45) (16 - 18)  SpO2: 96% (08 Apr 2020 12:22) (86% - 100%)  PE  nad  aax3  [id number]  ctabl  softnt+bs  nocce    #COVID PNA - worsening infiltrates - starte on HCQ - QTC acceptable  on 2L Nc - - dropped to 86% on RA at rest - placed back on O2  discussed with daughter over phone - main complaint for return to Er was uncontrolled vomiting , diarrhea - denies - abd pain  monitor for 24 hrs - cont tx lovenox - may  need to go home on O2 if not improving   < from: CT Abdomen and Pelvis w/ IV Cont (04.06.20 @ 14:09) >    IMPRESSION:     1.  No CT evidence of acute abdominopelvic pathology.    2.  Ground glass and consolidative peripheral bilateral opacities, which may be seen with atypical infection including viral pneumonia.    amylase/lipase elevated but asymptomatic and no CT evidence of pancreatitis  IVfs can be dc if tolerating diet  pt unable to tolerate po - reglan ordered - diet as tolerated - follow up  started on tx lovenox - adjust dose based on weight - Ddimer 7K  no vomiting this am + large BM  #hyperkalemia - ACEI held - resolved    discussed with team and family on phone .    04-07    135  |  97<L>  |  24<H>  ----------------------------<  120<H>  4.3   |  22  |  1.0    Ca    8.5      07 Apr 2020 07:21    TPro  7.1  /  Alb  3.4<L>  /  TBili  0.3  /  DBili  x   /  AST  26  /  ALT  15  /  AlkPhos  43  04-07

## 2020-04-08 NOTE — ED ADULT NURSE REASSESSMENT NOTE - NS ED NURSE REASSESS COMMENT FT1
pt is awake and alert, nad, respirations easy and regular, skin is warm, dry, and normal in color, pt is comfortable and resting, pt is on 3L NC O2 sat  97%

## 2020-04-08 NOTE — PROGRESS NOTE ADULT - ASSESSMENT
70 yo female with hx of htn, hld, gerd, covid + 3/31 presented for progressing weakness and anorexia for the last few days.     # COVID positive on 3/31, weakness   - Pt clinically stable at this time. Currently satting 97% on 2L NC.  - CT showed consolidations within the bilateral lower lobes   - Labwork significant for lymphopenia, D-dimer 7015  - C/w airborne and contact isolation. Limit healthcare professional contact  - C/w supplemntal O2. Maintain SaO2>92%  - f/u CRP and Procalcitonin, ferritin  - QTc: 479. Continue plaquenil.  - start therapeutic lovenox  - f/u ID eval    # Elevated lipase  - CT abd: no sign of pancreatitis   - pt is asymptomatic now     # HTN  - hold lisinopril for hyperkalemia. Switch to amlodipine if needed    # DLD  - cw atorvastain     DIET:DASH  DVT ppx: therapeutic lovenox  GI ppx: not needed  Dispo: Acute, from home  full code     *** Called the daughter, Amber Fitch () on 4/7 and updated her on patients condition *** 70 yo female with hx of htn, hld, gerd, covid + 3/31 presented for progressing weakness and anorexia for the last few days.     # COVID positive on 3/31, weakness   - Pt clinically stable at this time. De-sat to 86% on Room air, which improved to 92% on 2L NC.   - CT showed consolidations within the bilateral lower lobes   - Labwork significant for lymphopenia, D-dimer 7015  - C/w airborne and contact isolation. Limit healthcare professional contact  - C/w supplemental O2. Maintain SaO2>92%  - CRP = 22; Procalcitonin = 0.14; Ferritin = 972; D-dimer = 7000  - QTc: 479. Continue plaquenil.  - Continue therapeutic lovenox  - f/u ID eval    # Elevated lipase  - CT abd: no sign of pancreatitis   - pt is asymptomatic now     # HTN  - hold lisinopril for hyperkalemia. Switch to amlodipine if needed    # DLD  - cw atorvastain     DIET:DASH  DVT ppx: therapeutic lovenox  GI ppx: not needed  Dispo: Acute, from home  full code     *** Called the daughter, Amber Fitch () on 4/7 and 4/8 and updated her on patients condition ***

## 2020-04-09 ENCOUNTER — TRANSCRIPTION ENCOUNTER (OUTPATIENT)
Age: 70
End: 2020-04-09

## 2020-04-09 LAB
ALBUMIN SERPL ELPH-MCNC: 3 G/DL — LOW (ref 3.5–5.2)
ALP SERPL-CCNC: 59 U/L — SIGNIFICANT CHANGE UP (ref 30–115)
ALT FLD-CCNC: 25 U/L — SIGNIFICANT CHANGE UP (ref 0–41)
ANION GAP SERPL CALC-SCNC: 16 MMOL/L — HIGH (ref 7–14)
AST SERPL-CCNC: 39 U/L — SIGNIFICANT CHANGE UP (ref 0–41)
BASOPHILS # BLD AUTO: 0.01 K/UL — SIGNIFICANT CHANGE UP (ref 0–0.2)
BASOPHILS NFR BLD AUTO: 0.1 % — SIGNIFICANT CHANGE UP (ref 0–1)
BILIRUB SERPL-MCNC: 0.4 MG/DL — SIGNIFICANT CHANGE UP (ref 0.2–1.2)
BUN SERPL-MCNC: 18 MG/DL — SIGNIFICANT CHANGE UP (ref 10–20)
CALCIUM SERPL-MCNC: 8.4 MG/DL — LOW (ref 8.5–10.1)
CHLORIDE SERPL-SCNC: 98 MMOL/L — SIGNIFICANT CHANGE UP (ref 98–110)
CO2 SERPL-SCNC: 23 MMOL/L — SIGNIFICANT CHANGE UP (ref 17–32)
CREAT SERPL-MCNC: 1 MG/DL — SIGNIFICANT CHANGE UP (ref 0.7–1.5)
EOSINOPHIL # BLD AUTO: 0 K/UL — SIGNIFICANT CHANGE UP (ref 0–0.7)
EOSINOPHIL NFR BLD AUTO: 0 % — SIGNIFICANT CHANGE UP (ref 0–8)
GLUCOSE SERPL-MCNC: 105 MG/DL — HIGH (ref 70–99)
HCT VFR BLD CALC: 36.1 % — LOW (ref 37–47)
HGB BLD-MCNC: 12.1 G/DL — SIGNIFICANT CHANGE UP (ref 12–16)
IMM GRANULOCYTES NFR BLD AUTO: 1 % — HIGH (ref 0.1–0.3)
LYMPHOCYTES # BLD AUTO: 0.88 K/UL — LOW (ref 1.2–3.4)
LYMPHOCYTES # BLD AUTO: 11.3 % — LOW (ref 20.5–51.1)
MAGNESIUM SERPL-MCNC: 2.4 MG/DL — SIGNIFICANT CHANGE UP (ref 1.8–2.4)
MCHC RBC-ENTMCNC: 30.9 PG — SIGNIFICANT CHANGE UP (ref 27–31)
MCHC RBC-ENTMCNC: 33.5 G/DL — SIGNIFICANT CHANGE UP (ref 32–37)
MCV RBC AUTO: 92.3 FL — SIGNIFICANT CHANGE UP (ref 81–99)
MONOCYTES # BLD AUTO: 0.75 K/UL — HIGH (ref 0.1–0.6)
MONOCYTES NFR BLD AUTO: 9.7 % — HIGH (ref 1.7–9.3)
NEUTROPHILS # BLD AUTO: 6.05 K/UL — SIGNIFICANT CHANGE UP (ref 1.4–6.5)
NEUTROPHILS NFR BLD AUTO: 77.9 % — HIGH (ref 42.2–75.2)
NRBC # BLD: 0 /100 WBCS — SIGNIFICANT CHANGE UP (ref 0–0)
PLATELET # BLD AUTO: 282 K/UL — SIGNIFICANT CHANGE UP (ref 130–400)
POTASSIUM SERPL-MCNC: 4.1 MMOL/L — SIGNIFICANT CHANGE UP (ref 3.5–5)
POTASSIUM SERPL-SCNC: 4.1 MMOL/L — SIGNIFICANT CHANGE UP (ref 3.5–5)
PROT SERPL-MCNC: 6.5 G/DL — SIGNIFICANT CHANGE UP (ref 6–8)
RBC # BLD: 3.91 M/UL — LOW (ref 4.2–5.4)
RBC # FLD: 13.1 % — SIGNIFICANT CHANGE UP (ref 11.5–14.5)
SODIUM SERPL-SCNC: 137 MMOL/L — SIGNIFICANT CHANGE UP (ref 135–146)
WBC # BLD: 7.77 K/UL — SIGNIFICANT CHANGE UP (ref 4.8–10.8)
WBC # FLD AUTO: 7.77 K/UL — SIGNIFICANT CHANGE UP (ref 4.8–10.8)

## 2020-04-09 PROCEDURE — 99233 SBSQ HOSP IP/OBS HIGH 50: CPT

## 2020-04-09 RX ORDER — ENOXAPARIN SODIUM 100 MG/ML
40 INJECTION SUBCUTANEOUS
Qty: 1120 | Refills: 0
Start: 2020-04-09 | End: 2020-04-22

## 2020-04-09 RX ORDER — ENOXAPARIN SODIUM 100 MG/ML
40 INJECTION SUBCUTANEOUS
Qty: 560 | Refills: 0
Start: 2020-04-09 | End: 2020-04-22

## 2020-04-09 RX ORDER — ACETAMINOPHEN 500 MG
2 TABLET ORAL
Qty: 0 | Refills: 0 | DISCHARGE
Start: 2020-04-09

## 2020-04-09 RX ORDER — HYDROXYCHLOROQUINE SULFATE 200 MG
400 TABLET ORAL
Qty: 0 | Refills: 0 | DISCHARGE
Start: 2020-04-09

## 2020-04-09 RX ADMIN — PANTOPRAZOLE SODIUM 40 MILLIGRAM(S): 20 TABLET, DELAYED RELEASE ORAL at 17:12

## 2020-04-09 RX ADMIN — Medication 200 MILLIGRAM(S): at 17:12

## 2020-04-09 RX ADMIN — Medication 200 MILLIGRAM(S): at 06:41

## 2020-04-09 RX ADMIN — ENOXAPARIN SODIUM 70 MILLIGRAM(S): 100 INJECTION SUBCUTANEOUS at 06:01

## 2020-04-09 RX ADMIN — ENOXAPARIN SODIUM 70 MILLIGRAM(S): 100 INJECTION SUBCUTANEOUS at 17:12

## 2020-04-09 RX ADMIN — SODIUM CHLORIDE 75 MILLILITER(S): 9 INJECTION, SOLUTION INTRAVENOUS at 06:14

## 2020-04-09 NOTE — PROGRESS NOTE ADULT - ASSESSMENT
68 yo Korean speaking. female with hx of htn, hld, gerd, covid + 3/31 presented for progressing weakness and anorexia  for the last few days. Transfer for [facility] for monitoring.      Assessment & Plan:  # COVID positive on 3/31, weakness, decreased PO intake  - Pt clinically stable at this time. De-sat to 86% on room air yesterday, which improved to 92% on RA at rest. Sating 95% on 2L NC at rest.   - Labwork significant for lymphopenia, D-dimer 7015  - C/w supplemental O2. Maintain SaO2>90%  - CRP = 22; Procalcitonin = 0.14; Ferritin = 972; D-dimer = 7000  - QTc: 479. Continue plaquenil.  - Continue therapeutic lovenox. Patient will be discharged on 40 mg lovenox QD (Sent to [facility] pharmacy and co-pay is $1) for 2 weeks.    # Elevated lipase  - CT abd: no sign of pancreatitis   - pt is asymptomatic now     # HTN  - hold lisinopril for hyperkalemia. Switch to amlodipine if needed    # DLD  - cw atorvastain     #DIET:   - DASH    #DVT ppx:   therapeutic lovenox. Patient will be discharged on lovenox 40 mg QD for 2 weeks. (Sent to [facility] pharmacy, co-pay is $1).     COORDINATION OF CARE:  Care Discussed with Consultants/Other Providers: 68 yo Indonesian speaking. female with hx of htn, hld, gerd, covid + 3/31 presented for progressing weakness and anorexia  for the last few days. Transfer to [facility] for monitoring.      Assessment & Plan:  # COVID positive on 3/31, weakness, decreased PO intake  - Pt clinically stable at this time. De-sat to 86% on room air yesterday, which improved to 92% on RA at rest. Sating 95% on 2L NC at rest.   - Labwork significant for lymphopenia, D-dimer 7015  - C/w supplemental O2. Maintain SaO2>90%  - CRP = 22; Procalcitonin = 0.14; Ferritin = 972; D-dimer = 7000  - QTc: 479. Continue Plaquenil.  - Continue therapeutic Lovenox. Patient will be discharged on 40 mg lovenox QD (Sent to [facility] pharmacy and co-pay is $1) for 2 weeks.    # Elevated lipase  - CT abd: no sign of pancreatitis   - pt is asymptomatic now     # HTN  - hold Lisinopril for hyperkalemia. Switch to amlodipine if needed    # DLD  - cw Atorvastain     #DIET:   - DASH    #DVT ppx:   therapeutic Lovenox. Patient will be discharged on Lovenox 40 mg QD for 2 weeks. (Sent to [facility] pharmacy, co-pay is $1).     COORDINATION OF CARE:  Care Discussed with Consultants/Other Providers:

## 2020-04-09 NOTE — DISCHARGE NOTE PROVIDER - PROVIDER TOKENS
FREE:[LAST:[Primary Care Physician],PHONE:[(   )    -],FAX:[(   )    -]] FREE:[LAST:[Primary Care Physician],PHONE:[(   )    -],FAX:[(   )    -],FOLLOWUP:[2 weeks]]

## 2020-04-09 NOTE — DISCHARGE NOTE PROVIDER - HOSPITAL COURSE
70 yo Nepali speaking. female with hx of htn, hld, gerd, covid + 3/31 presented for progressing weakness and anorexia  for the last few days. She was seen in ED x2 and LLL opacity on cxr and given levaquin x5 days, initially presented with sob, cough, fever, nausea, vomiting for 10 days, cough has resolved but continues to have weakness, nausea, vomiting and now mild RLQ abdominal pain for the past 2 days. She denies cough, abdominal pain, sob  chills, cp, urinary symptoms.         IN ED: T(C): 38.9 ,HR: 92,BP: 101/56,RR: 20,SpO2: 96% ON 2L NC        CT showed consolidations within the bilateral lower lobes     Labwork significant for lymphopenia, D-dimer 7015    CRP = 22; Procalcitonin = 0.14; Ferritin = 972;     D-dimer = 7000. Patient was started on therapeutic lovenox.     QTc: 479. Patient was started on plaquenil.        COVID positive on 3/31, weakness, decreased PO intake    On 4/8 patient De-sat to 88% on room air on exertion, which improved to 95% on 2L NC. 70 yo Belarusian speaking. female with hx of htn, hld, gerd, covid + 3/31 presented for progressing weakness and anorexia  for the last few days. She was seen in ED x2 and LLL opacity on cxr and given levaquin x5 days, initially presented with sob, cough, fever, nausea, vomiting for 10 days, cough has resolved but continues to have weakness, nausea, vomiting and now mild RLQ abdominal pain for the past 2 days. She denies cough, abdominal pain, sob  chills, cp, urinary symptoms.         IN ED: T(C): 38.9 ,HR: 92,BP: 101/56,RR: 20,SpO2: 96% ON 2L NC        CT showed consolidations within the bilateral lower lobes     Labwork significant for lymphopenia, D-dimer 7015    CRP = 22; Procalcitonin = 0.14; Ferritin = 972;     D-dimer = 7000. Patient was started on therapeutic lovenox. 70 mg BID    QTc: 479. Patient was started on plaquenil.        COVID positive on 3/31, weakness, decreased PO intake    On 4/8 patient De-sat to 88% on room air on exertion, which improved to 91% on RA at rest. Sating 95% on 2L NC at rest.         Patient stable for transfer to [facility]. 70 yo Swedish speaking. female with hx of htn, hld, gerd, covid + 3/31 presented for progressing weakness and anorexia  for the last few days. She was seen in ED x2 and LLL opacity on cxr and given levaquin x5 days, initially presented with sob, cough, fever, nausea, vomiting for 10 days, cough has resolved but continues to have weakness, nausea, vomiting and now mild RLQ abdominal pain for the past 2 days. She denies cough, abdominal pain, sob  chills, cp, urinary symptoms.         CT showed consolidations within the bilateral lower lobes     Labwork significant for lymphopenia, D-dimer 7015    CRP = 22; Procalcitonin = 0.14; Ferritin = 972;     D-dimer = 7000. Patient was started on therapeutic lovenox. 70 mg BID    QTc: 479. Patient was started on plaquenil.        COVID positive on 3/31, weakness, decreased PO intake    On 4/8 patient De-sat to 88% on room air on exertion, which improved to 91% on RA at rest. Sating 95% on 2L NC at rest.         Patient transfered to [facility] and was weaned off Oxygen, sats remained 89-92% on Room air and ambulation. She is being discharged on Xarelto 10 mg daily for 30 days.

## 2020-04-09 NOTE — DISCHARGE NOTE PROVIDER - NSDCMRMEDTOKEN_GEN_ALL_CORE_FT
acetaminophen 325 mg oral tablet: 2 tab(s) orally every 6 hours, As needed, Mild Pain (1 - 3)  acetaminophen 325 mg oral tablet: 2 tab(s) orally every 6 hours, As needed, Temp greater or equal to 38C (100.4F)  Lovenox 40 mg/0.4 mL injectable solution: 40 milligram(s) subcutaneously 2 times a day for 14 days. acetaminophen 325 mg oral tablet: 2 tab(s) orally every 6 hours, As needed, Mild Pain (1 - 3)  acetaminophen 325 mg oral tablet: 2 tab(s) orally every 6 hours, As needed, Temp greater or equal to 38C (100.4F)  hydroxychloroquine 200 mg oral tablet: 400 milligram(s) orally once a day for two more days (last day 4/11).  Lovenox 40 mg/0.4 mL injectable solution: 40 milligram(s) subcutaneously once a day  for 14 days. acetaminophen 325 mg oral tablet: 2 tab(s) orally every 6 hours, As needed, Mild Pain (1 - 3)  acetaminophen 325 mg oral tablet: 2 tab(s) orally every 6 hours, As needed, Temp greater or equal to 38C (100.4F)  ascorbic acid 500 mg oral tablet: 1 tab(s) orally 2 times a day   Xarelto 10 mg oral tablet: 1 tab(s) orally once a day   zinc sulfate 220 mg oral capsule: 1 cap(s) orally once a day

## 2020-04-09 NOTE — DISCHARGE NOTE PROVIDER - NSDCCPCAREPLAN_GEN_ALL_CORE_FT
PRINCIPAL DISCHARGE DIAGNOSIS  Diagnosis: COVID-19 virus detected  Assessment and Plan of Treatment: - You have tested POSITIVE for the novel coronavirus (COVID-19).   - Upon discharge, you must self-quarantine for 14 days, or until the Department of Health contacts you.   - Please wear a face mask if you are around other individuals. This may include a home-made mask or bandana from cloth.   - Practice frequent and thorough hand-washing.  - Try to avoid contact with house members, family, and friends for the duration of this quarantine. Maintain a distance of 6 ft from other individuals. If your living situation allows, sleep in a separate bedroom and use a separate bathroom.  - Please follow up with your primary care physician within 2 weeks of your discharge from the hospital.   - Please take all medications as prescribed.   - Continue injections with lovenox 40 mg twice a day for 2 weeks.  - If you experience any worsening or recurrence of your symptoms, particularly worsening or high fever, shortness of breathe, extreme fatigue, or bloody cough please call 9-1-1 immediately or report to the nearest Emergency Department.      SECONDARY DISCHARGE DIAGNOSES  Diagnosis: Decreased oral intake  Assessment and Plan of Treatment: - continue to eat and drink plenty of water. PRINCIPAL DISCHARGE DIAGNOSIS  Diagnosis: COVID-19 virus detected  Assessment and Plan of Treatment: - You have tested POSITIVE for the novel coronavirus (COVID-19).   - Upon discharge, you must self-quarantine for 14 days, or until the Department of Health contacts you.   - Please wear a face mask if you are around other individuals. This may include a home-made mask or bandana from cloth.   - Practice frequent and thorough hand-washing.  - Try to avoid contact with house members, family, and friends for the duration of this quarantine. Maintain a distance of 6 ft from other individuals. If your living situation allows, sleep in a separate bedroom and use a separate bathroom.  - Please follow up with your primary care physician within 2 weeks of your discharge from the hospital.   - Please take all medications as prescribed.   - Continue injections with lovenox 40 mg once a day for 2 weeks.  - If you experience any worsening or recurrence of your symptoms, particularly worsening or high fever, shortness of breathe, extreme fatigue, or bloody cough please call 9-1-1 immediately or report to the nearest Emergency Department.      SECONDARY DISCHARGE DIAGNOSES  Diagnosis: Decreased oral intake  Assessment and Plan of Treatment: - continue to eat and drink plenty of water.

## 2020-04-09 NOTE — DISCHARGE NOTE PROVIDER - CARE PROVIDER_API CALL
Primary Care Physician,   Phone: (   )    -  Fax: (   )    -  Follow Up Time: Primary Care Physician,   Phone: (   )    -  Fax: (   )    -  Follow Up Time: 2 weeks

## 2020-04-09 NOTE — PROGRESS NOTE ADULT - ATTENDING COMMENTS
Patient seen and examined independently. I agree with the resident's note, physical exam, and plan except as below.  Vital Signs Last 24 Hrs  T(C): 37.1 (09 Apr 2020 03:17), Max: 38.2 (08 Apr 2020 16:29)  T(F): 98.7 (09 Apr 2020 03:17), Max: 100.7 (08 Apr 2020 16:29)  HR: 83 (09 Apr 2020 03:17) (83 - 85)  BP: 117/68 (09 Apr 2020 03:17) (112/67 - 117/68)  BP(mean): --  RR: 20 (09 Apr 2020 03:17) (20 - 20)  SpO2: 95% (09 Apr 2020 10:35) (92% - 95%)  Pe  nad  aax3  [id number]  ctabl  softnt+bs  nocce    #COVID PNA - worsening infiltrates - starte on HCQ - QTC acceptable  on 2L Nc - - dropped to 86% on RA at rest - placed back on O2  stable on O2 - desats off O2  will try to set up home o2 - stable for transfer to [facility]  discussed with daughter over phone   monitor for 24 hrs - cont tx lovenox - may  need to go home on O2 if not improving   < from: CT Abdomen and Pelvis w/ IV Cont (04.06.20 @ 14:09) >    IMPRESSION:     1.  No CT evidence of acute abdominopelvic pathology.    2.  Ground glass and consolidative peripheral bilateral opacities, which may be seen with atypical infection including viral pneumonia.    amylase/lipase elevated but asymptomatic and no CT evidence of pancreatitis  still no appetite  - decreased po intake -+weakness  pt unable to tolerate po - reglan ordered - diet as tolerated - follow up  started on tx lovenox - adjust dose based on weight - Ddimer 7K - CHANGE TO LOVENOX 40mg q24 upon discharge for 2 weeks    #hyperkalemia - ACEI held - resolved    discussed with team and family on phone .

## 2020-04-09 NOTE — PROGRESS NOTE ADULT - SUBJECTIVE AND OBJECTIVE BOX
SUBJECTIVE:    Patient is a 68 y/o Botswanan-speaking Female who presents with a chief complaint of weakness (06 Apr 2020 14:57)    Currently admitted to medicine with the primary diagnosis of COVID-19.      Today is hospital day 3. Patient was seen and examined at bedside. No overnight events. Sating 95% on 2L nasal cannula. Still having decreased PO intake, although improved. Spoke to daughter on the phone in patients room and communicated to patient that she should try eating as much as she can and maintain good hydration.     PAST MEDICAL & SURGICAL HISTORY  PAST MEDICAL & SURGICAL HISTORY:  Chronic GERD  HLD (hyperlipidemia)    SOCIAL HISTORY:  no smoker  no alcohol or drug abuse  lives with daughter    ALLERGIES:  No Known Allergies    MEDICATIONS:  MEDICATIONS  (STANDING):  atorvastatin 20 milliGRAM(s) Oral at bedtime  chlorhexidine 4% Liquid 1 Application(s) Topical <User Schedule>  dextrose 5% + sodium chloride 0.45%. 1000 milliLiter(s) (75 mL/Hr) IV Continuous <Continuous>  enoxaparin Injectable 70 milliGRAM(s) SubCutaneous every 12 hours  hydroxychloroquine   Oral   hydroxychloroquine 200 milliGRAM(s) Oral every 12 hours  pantoprazole  Injectable 40 milliGRAM(s) IV Push daily    MEDICATIONS  (PRN):  acetaminophen   Tablet .. 650 milliGRAM(s) Oral every 6 hours PRN Mild Pain (1 - 3)  acetaminophen   Tablet .. 650 milliGRAM(s) Oral every 6 hours PRN Temp greater or equal to 38C (100.4F)      VITALS:   Vital Signs (24 Hrs):  T(C): 37.1 (04-09-20 @ 03:17), Max: 38.2 (04-08-20 @ 16:29)  HR: 83 (04-09-20 @ 03:17) (83 - 85)  BP: 117/68 (04-09-20 @ 03:17) (107/67 - 117/68)  RR: 20 (04-09-20 @ 03:17) (16 - 20)  SpO2: 95% (04-09-20 @ 03:17) (86% - 100%)  Wt(kg): --  Daily     Daily     I&O's Summary      LABS:                                      12.1   7.77  )-----------( 282      ( 09 Apr 2020 07:04 )             36.1                     RADIOLOGY:  < from: CT Abdomen and Pelvis w/ IV Cont (04.06.20 @ 14:09) >    IMPRESSION:     1.  No CT evidence of acute abdominopelvic pathology.    2.  Ground glass and consolidative peripheral bilateral opacities, which may be seen with atypical infection including viral pneumonia.      < end of copied text >    < from: Xray Chest 1 View AP/PA (04.06.20 @ 11:10) >    Impression: Increased peripheral mid to lower lung field opacity since prior. Findings are compatible with pneumonia in the proper clinical setting      < end of copied text >        PHYSICAL EXAM:  GENERAL: Elderly F in NAD, speaks in full sentences, no signs of respiratory distress. On 2L NC  HEAD: Atraumatic  NECK: Supple  CHEST/LUNG: Clear to auscultation bilaterally; No wheeze or crackles  HEART: S1, S2; RRR; No murmurs, rubs, or gallops  ABDOMEN: BS+; Soft, Non-tender, Non-distended  EXTREMITIES:  2+ Peripheral Pulses, No clubbing, cyanosis, or edema  PSYCH: AAOx3  NEUROLOGY: non-focal  SKIN: No rashes or lesions

## 2020-04-09 NOTE — PROGRESS NOTE ADULT - ASSESSMENT
70 yo female with hx of htn, hld, gerd, covid + 3/31 presented for progressing weakness and anorexia for the last few days.     # COVID positive on 3/31, weakness, decreased PO intake  - Pt clinically stable at this time. De-sat to 86% on room air yesterday, which improved to 95% on 2L NC.   - CT showed consolidations within the bilateral lower lobes   - Labwork significant for lymphopenia, D-dimer 7015  - C/w airborne and contact isolation. Limit healthcare professional contact  - C/w supplemental O2. Maintain SaO2>92%  - CRP = 22; Procalcitonin = 0.14; Ferritin = 972; D-dimer = 7000  - QTc: 479. Continue plaquenil.  - Continue therapeutic lovenox. Patient will be discharged on 40 mg lovenox BID (Sent to [facility] and co-pay is $1).     # Elevated lipase  - CT abd: no sign of pancreatitis   - pt is asymptomatic now     # HTN  - hold lisinopril for hyperkalemia. Switch to amlodipine if needed    # DLD  - cw atorvastain     DIET: DASH  DVT ppx: therapeutic lovenox. Patient will be discharged on lovenox 40 mg BID for 2 weeks. (Sent to [facility] pharmacy, co-pay is $1).   GI ppx: not needed  Dispo: Acute, from home  full code     *** Called the daughter, Amber Fitch () on 4/7, 4/8 and 4/9 and updated her on patients condition *** 68 yo female with hx of htn, hld, gerd, covid + 3/31 presented for progressing weakness and anorexia for the last few days.     # COVID positive on 3/31, weakness, decreased PO intake  - Pt clinically stable at this time. De-sat to 86% on room air yesterday, which improved to 95% on 2L NC.   - CT showed consolidations within the bilateral lower lobes   - Labwork significant for lymphopenia, D-dimer 7015  - C/w airborne and contact isolation. Limit healthcare professional contact  - C/w supplemental O2. Maintain SaO2>92%  - CRP = 22; Procalcitonin = 0.14; Ferritin = 972; D-dimer = 7000  - QTc: 479. Continue plaquenil.  - Continue therapeutic lovenox. Patient will be discharged on 40 mg lovenox BID (Sent to [facility] and co-pay is $1).     # Elevated lipase  - CT abd: no sign of pancreatitis   - pt is asymptomatic now     # HTN  - hold lisinopril for hyperkalemia. Switch to amlodipine if needed    # DLD  - cw atorvastain     DIET: DASH  DVT ppx: therapeutic lovenox. Patient will be discharged on lovenox 40 mg BID for 2 weeks. (Sent to [facility] pharmacy, co-pay is $1).   GI ppx: not needed  Dispo: Acute, from home  full code     ** Routine labs not ordered for 4/10  *** Called the daughter, Amber Fitch () on 4/7, 4/8 and 4/9 and updated her on patients condition *** 70 yo female with hx of htn, hld, gerd, covid + 3/31 presented for progressing weakness and anorexia for the last few days.     # COVID positive on 3/31, weakness, decreased PO intake  - Pt clinically stable at this time. De-sat to 86% on room air yesterday, which improved to 95% on 2L NC.   - CT showed consolidations within the bilateral lower lobes   - Labwork significant for lymphopenia, D-dimer 7015  - C/w airborne and contact isolation. Limit healthcare professional contact  - C/w supplemental O2. Maintain SaO2>92%  - CRP = 22; Procalcitonin = 0.14; Ferritin = 972; D-dimer = 7000  - QTc: 479. Continue plaquenil.  - Continue therapeutic lovenox. Patient will be discharged on 40 mg lovenox QD (Sent to [facility] and co-pay is $1).     # Elevated lipase  - CT abd: no sign of pancreatitis   - pt is asymptomatic now     # HTN  - hold lisinopril for hyperkalemia. Switch to amlodipine if needed    # DLD  - cw atorvastain     DIET: DASH  DVT ppx: therapeutic lovenox. Patient will be discharged on lovenox 40 mg QD for 2 weeks. (Sent to [facility] pharmacy, co-pay is $1).   GI ppx: not needed  Dispo: Acute, from home  full code     ** Routine labs not ordered for 4/10  *** Called the daughter, Amber Fitch () on 4/7, 4/8 and 4/9 and updated her on patients condition ***

## 2020-04-09 NOTE — PROGRESS NOTE ADULT - SUBJECTIVE AND OBJECTIVE BOX
Medicine Progress Note    70 yo Luxembourgish speaking. female with hx of htn, hld, gerd, covid + 3/31 presented for progressing weakness and anorexia  for the last few days. She was seen in ED x2 and LLL opacity on cxr and given levaquin x5 days, initially presented with sob, cough, fever, nausea, vomiting for 10 days, cough has resolved but continues to have weakness, nausea, vomiting and now mild RLQ abdominal pain for the past 2 days. Transfer to [facility] for monitoring, stable on 2L NC. Denies SOB, headaches, chest pain, and diarrhea.      SUBJECTIVE / OVERNIGHT EVENTS:  4/9: Transferred to [facility] for monitoring. Stable on 2L NC, Sat O2 94%    ADDITIONAL REVIEW OF SYSTEMS:    Physical Exam:  CONSTITUTIONAL: NAD, well-developed, well-groomed  ENMT: Moist oral mucosa, no pharyngeal injection or exudates; normal dentition  RESPIRATORY: Normal respiratory effort; lungs are clear to auscultation bilaterally  CARDIOVASCULAR: Regular rate and rhythm, normal S1 and S2, no murmur/rub/gallop; No lower extremity edema; Peripheral pulses are 2+ bilaterally  ABDOMEN: Nontender to palpation, normoactive bowel sounds, no rebound/guarding; No hepatosplenomegaly  PSYCH: A+O to person, place, and time; affect appropriate  NEUROLOGY: CN 2-12 are intact and symmetric; no gross sensory deficits   SKIN: No rashes; no palpable lesions    MEDICATIONS  (STANDING):  atorvastatin 20 milliGRAM(s) Oral at bedtime  chlorhexidine 4% Liquid 1 Application(s) Topical <User Schedule>  dextrose 5% + sodium chloride 0.45%. 1000 milliLiter(s) (75 mL/Hr) IV Continuous <Continuous>  enoxaparin Injectable 70 milliGRAM(s) SubCutaneous every 12 hours  hydroxychloroquine   Oral   hydroxychloroquine 200 milliGRAM(s) Oral every 12 hours  pantoprazole  Injectable 40 milliGRAM(s) IV Push daily    MEDICATIONS  (PRN):  acetaminophen   Tablet .. 650 milliGRAM(s) Oral every 6 hours PRN Mild Pain (1 - 3)  acetaminophen   Tablet .. 650 milliGRAM(s) Oral every 6 hours PRN Temp greater or equal to 38C (100.4F)      Vital Signs Last 24 Hrs  T(C): 36.5 (09 Apr 2020 18:58), Max: 37.6 (09 Apr 2020 16:07)  T(F): 97.7 (09 Apr 2020 18:58), Max: 99.7 (09 Apr 2020 16:07)  HR: 82 (09 Apr 2020 18:58) (70 - 83)  BP: 144/74 (09 Apr 2020 18:58) (115/66 - 144/74)  BP(mean): --  RR: 34 (09 Apr 2020 18:58) (18 - 34)  SpO2: 91% (09 Apr 2020 18:58) (91% - 96%)    LABS:                        12.1   7.77  )-----------( 282      ( 09 Apr 2020 07:04 )             36.1     04-09    137  |  98  |  18  ----------------------------<  105<H>  4.1   |  23  |  1.0    Ca    8.4<L>      09 Apr 2020 07:04  Mg     2.4     04-09    TPro  6.5  /  Alb  3.0<L>  /  TBili  0.4  /  DBili  x   /  AST  39  /  ALT  25  /  AlkPhos  59  04-09              COVID-19 PCR: Detected (31 Mar 2020 20:20)      RADIOLOGY & ADDITIONAL TESTS:  Imaging from Last 24 Hours:    EXAM:  XR CHEST FRONTAL 1V        PROCEDURE DATE:  04/06/2020    Impression: Increased peripheral mid to lower lung field opacity since prior. Findings are compatible with pneumonia in the proper clinical setting Medicine Progress Note    70 yo Lao speaking female with hx of HTN, HLD,GERD, covid + 3/31 presented for progressing weakness and anorexia  for the last few days. She was seen in ED x2 and LLL opacity on cxr and given Levaquin x5 days. Her initial presentation was sob, cough, fever, nausea, vomiting for 10 days.  Cough has resolved but she continued to have weakness, nausea, vomiting and now mild RLQ abdominal pain for the past 2 days. Transfer to [facility] for monitoring, stable on 2L NC. Denies SOB, headaches, chest pain, and diarrhea.      SUBJECTIVE / OVERNIGHT EVENTS:  4/9: Transferred to [facility] for monitoring. Stable on 2L NC, Sat O2 94%    ADDITIONAL REVIEW OF SYSTEMS:    Physical Exam:  CONSTITUTIONAL: NAD, well-developed, well-groomed  ENMT: Moist oral mucosa, no pharyngeal injection or exudates; normal dentition  RESPIRATORY: Normal respiratory effort; lungs are clear to auscultation bilaterally  CARDIOVASCULAR: Regular rate and rhythm, normal S1 and S2, no murmur/rub/gallop; No lower extremity edema; Peripheral pulses are 2+ bilaterally  ABDOMEN: Nontender to palpation, normoactive bowel sounds, no rebound/guarding; No hepatosplenomegaly  PSYCH: A+O to person, place, and time; affect appropriate  NEUROLOGY: CN 2-12 are intact and symmetric; no gross sensory deficits   SKIN: No rashes; no palpable lesions    MEDICATIONS  (STANDING):  atorvastatin 20 milliGRAM(s) Oral at bedtime  chlorhexidine 4% Liquid 1 Application(s) Topical <User Schedule>  dextrose 5% + sodium chloride 0.45%. 1000 milliLiter(s) (75 mL/Hr) IV Continuous <Continuous>  enoxaparin Injectable 70 milliGRAM(s) SubCutaneous every 12 hours  hydroxychloroquine   Oral   hydroxychloroquine 200 milliGRAM(s) Oral every 12 hours  pantoprazole  Injectable 40 milliGRAM(s) IV Push daily    MEDICATIONS  (PRN):  acetaminophen   Tablet .. 650 milliGRAM(s) Oral every 6 hours PRN Mild Pain (1 - 3)  acetaminophen   Tablet .. 650 milliGRAM(s) Oral every 6 hours PRN Temp greater or equal to 38C (100.4F)      Vital Signs Last 24 Hrs  T(C): 36.5 (09 Apr 2020 18:58), Max: 37.6 (09 Apr 2020 16:07)  T(F): 97.7 (09 Apr 2020 18:58), Max: 99.7 (09 Apr 2020 16:07)  HR: 82 (09 Apr 2020 18:58) (70 - 83)  BP: 144/74 (09 Apr 2020 18:58) (115/66 - 144/74)  BP(mean): --  RR: 34 (09 Apr 2020 18:58) (18 - 34)  SpO2: 91% (09 Apr 2020 18:58) (91% - 96%)    LABS:                        12.1   7.77  )-----------( 282      ( 09 Apr 2020 07:04 )             36.1     04-09    137  |  98  |  18  ----------------------------<  105<H>  4.1   |  23  |  1.0    Ca    8.4<L>      09 Apr 2020 07:04  Mg     2.4     04-09    TPro  6.5  /  Alb  3.0<L>  /  TBili  0.4  /  DBili  x   /  AST  39  /  ALT  25  /  AlkPhos  59  04-09              COVID-19 PCR: Detected (31 Mar 2020 20:20)      RADIOLOGY & ADDITIONAL TESTS:  Imaging from Last 24 Hours:    EXAM:  XR CHEST FRONTAL 1V        PROCEDURE DATE:  04/06/2020    Impression: Increased peripheral mid to lower lung field opacity since prior. Findings are compatible with pneumonia in the proper clinical setting

## 2020-04-10 RX ADMIN — PANTOPRAZOLE SODIUM 40 MILLIGRAM(S): 20 TABLET, DELAYED RELEASE ORAL at 17:15

## 2020-04-10 RX ADMIN — ATORVASTATIN CALCIUM 20 MILLIGRAM(S): 80 TABLET, FILM COATED ORAL at 23:45

## 2020-04-10 RX ADMIN — ENOXAPARIN SODIUM 70 MILLIGRAM(S): 100 INJECTION SUBCUTANEOUS at 17:15

## 2020-04-10 RX ADMIN — Medication 200 MILLIGRAM(S): at 17:17

## 2020-04-10 NOTE — PROGRESS NOTE ADULT - ATTENDING COMMENTS
68yo F with pmh HTN, HLD, and GERD was admitted for hypoxia secondary to COVID19 infection on 4/6/2020 (positive for COVID19 on 3/31/2020). Patient was transferred from [facility] to [facility] on 4/10/2020 because she had low SaO2 on room air and needed to continue oxygen by nasal cannula at 2L/min. Patient reports feeling better today, although has some weakness. No chest pain, fevers, or difficulty breathing. Tolerating nasal cannula oxygen at 2L/min.    Physical exam  Gen: Not in acute distress, speaks in full sentences  HEENT: Moist mucus membranes  CV: S1S2 present, RRR  Resp, Clear to auscultation bilaterally with crackles at bases  Abd: Soft, nontender  Ext: No LE edema or calf tenderness bilaterally  Skin: Warm and dry  Neuro: No gross focal deficits    Assessment/plan  1) COVID19 infection  - Continue oxygen by nasal cannula at 2L/min. Goal SaO2>90%. Wean off nasal cannula as tolerated.  - Monitor for fevers and signs of respiratory distress  - S/p azithromycin 4/6/2020 to 4/7/2020 (not continued afterwards)  - Hydroxychloroquine started 4/6/2020, to be completed on 4/10/2020    2) HTN  - Stable at this time  - Enalapril held due to hyperkalemia on admission; electrolytes stable at this time  - If SBP >140mmHg, consider starting Amlodipine 5mg daily    3) HLD  - Atorvastatin 20mg daily  - Low salt, low cholesterol diet    4) DVT/GI ppx  - D-dimer value of 7015  - Due to age >60 and elevated D-dimer, plan to discharge patient with Lovenox or Xarelto  - Enoxaparin 70mg q12h while inpatient  - Pantoprazole 40mg daily    5) Deconditioning  - PT and OT consult for inpatient physical therapy

## 2020-04-10 NOTE — PROGRESS NOTE ADULT - REASON FOR ADMISSION
weakness Patient is a 69y old  Female who presents with a chief complaint of weakness (09 Apr 2020 19:26) Hypoxia secondary to COVID19 infection

## 2020-04-10 NOTE — PHYSICAL THERAPY INITIAL EVALUATION ADULT - GAIT DISTANCE, PT EVAL
5 feet/pt reported not feeling well and returned to bed. SpO2: fluctuating in high 80s. After rest, 91% on 2L/min

## 2020-04-10 NOTE — PROGRESS NOTE ADULT - ASSESSMENT
68 yo Spanish speaking. female with hx of htn, hld, gerd, covid + 3/31 presented for progressing weakness and anorexia  for the last few days. Transfer to [facility] for monitoring.        Assessment & Plan:  # COVID positive on 3/31, weakness, decreased PO intake  Clinically stable, coughing less, no fevers, denies CP, SOB, and palpitations.  O2 remains on 2 L NC.   - Pt - Labwork significant for lymphopenia, D-dimer 7015  - C/w supplemental O2. Maintain SaO2>90%  - CRP = 22; Procalcitonin = 0.14; Ferritin = 972; D-dimer = 7000  - QTc: 479. Continue Plaquenil.  - Continue therapeutic Lovenox. Patient will be discharged on 40 mg lovenox QD (Sent to [facility] pharmacy and co-pay is $1) for 2 weeks.    # Elevated lipase  - CT abd: no sign of pancreatitis   - pt is asymptomatic now     # HTN  - hold Lisinopril for hyperkalemia. Switch to amlodipine if needed    # DLD  - cw Atorvastain     #DIET:   - DASH    #DVT ppx:   therapeutic Lovenox. Patient will be discharged on Lovenox 40 mg QD for 2 weeks. (Sent to [facility] pharmacy, co-pay is $1).     COORDINATION OF CARE:  Care Discussed with Consultants/Other Providers: 70 yo Divehi speaking. female with hx of htn, hld, gerd, covid + 3/31 admitted for hypoxia secondary to Covid19    Assessment & Plan:  # COVID19 infection  Clinically stable,   - Pt - Labwork significant for lymphopenia, D-dimer 7015  - C/w supplemental O2. Maintain SaO2>90%  - Continue Plaquenil.  - Continue therapeutic Lovenox. Patient will be discharged on 40 mg lovenox QD    # Elevated lipase  - CT abd: no sign of pancreatitis   - pt is asymptomatic now     # HTN  - hold Lisinopril for hyperkalemia. Switch to amlodipine if needed    # DLD  - cw Atorvastain     #DIET:   - DASH    #DVT ppx:   therapeutic Lovenox. Patient will be discharged on Lovenox 40 mg QD for 2 weeks 68 yo Wol[place] speaking. female with hx of htn, hld, gerd, covid + 3/31 admitted for hypoxia secondary to Covid19  Clinically stable, no distress, no complaints, continues on oxygen tx 2 L NC, decreased appetite.   Assessment & Plan:  # COVID19 infection     - Pt - Labwork significant for lymphopenia, D-dimer 7015  - C/w supplemental O2. Maintain SaO2>90%  - Continue Plaquenil.  - Continue therapeutic Lovenox. Patient will be discharged on 40 mg lovenox QD    # Elevated lipase  - CT abd: no sign of pancreatitis   - pt is asymptomatic now     # HTN  - hold Lisinopril for hyperkalemia. Switch to amlodipine if needed    # DLD  - cw Atorvastain     #DIET:   - DASH  -Will consider Ensure supplements into diet if pt continues to have a decreased appetite      #DVT ppx:   therapeutic Lovenox. Patient will be discharged on Lovenox 40 mg QD for 2 weeks

## 2020-04-10 NOTE — PROGRESS NOTE ADULT - SUBJECTIVE AND OBJECTIVE BOX
Medicine Progress Note    Patient is a 69y old  Female who presents with a chief complaint of weakness (09 Apr 2020 19:26)      SUBJECTIVE / OVERNIGHT EVENTS:    ADDITIONAL REVIEW OF SYSTEMS:    MEDICATIONS  (STANDING):  atorvastatin 20 milliGRAM(s) Oral at bedtime  chlorhexidine 4% Liquid 1 Application(s) Topical <User Schedule>  dextrose 5% + sodium chloride 0.45%. 1000 milliLiter(s) (75 mL/Hr) IV Continuous <Continuous>  enoxaparin Injectable 70 milliGRAM(s) SubCutaneous every 12 hours  hydroxychloroquine   Oral   hydroxychloroquine 200 milliGRAM(s) Oral every 12 hours  pantoprazole  Injectable 40 milliGRAM(s) IV Push daily    MEDICATIONS  (PRN):  acetaminophen   Tablet .. 650 milliGRAM(s) Oral every 6 hours PRN Mild Pain (1 - 3)  acetaminophen   Tablet .. 650 milliGRAM(s) Oral every 6 hours PRN Temp greater or equal to 38C (100.4F)    CAPILLARY BLOOD GLUCOSE        I&O's Summary    09 Apr 2020 07:01  -  10 Apr 2020 07:00  --------------------------------------------------------  IN: 0 mL / OUT: 500 mL / NET: -500 mL        PHYSICAL EXAM:  Vital Signs Last 24 Hrs  T(C): 37.1 (10 Apr 2020 04:49), Max: 37.6 (09 Apr 2020 16:07)  T(F): 98.7 (10 Apr 2020 04:49), Max: 99.7 (09 Apr 2020 16:07)  HR: 80 (10 Apr 2020 04:49) (70 - 86)  BP: 119/61 (10 Apr 2020 04:49) (114/56 - 144/74)  BP(mean): --  RR: 22 (10 Apr 2020 04:49) (18 - 34)  SpO2: 90% (10 Apr 2020 04:49) (90% - 96%)  CONSTITUTIONAL: NAD, well-developed, well-groomed  ENMT: Moist oral mucosa, no pharyngeal injection or exudates; normal dentition  RESPIRATORY: Normal respiratory effort; lungs are clear to auscultation bilaterally  CARDIOVASCULAR: Regular rate and rhythm, normal S1 and S2, no murmur/rub/gallop; No lower extremity edema; Peripheral pulses are 2+ bilaterally  ABDOMEN: Nontender to palpation, normoactive bowel sounds, no rebound/guarding; No hepatosplenomegaly  PSYCH: A+O to person, place, and time; affect appropriate  NEUROLOGY: CN 2-12 are intact and symmetric; no gross sensory deficits   SKIN: No rashes; no palpable lesions    LABS:                        12.1   7.77  )-----------( 282      ( 09 Apr 2020 07:04 )             36.1     04-09    137  |  98  |  18  ----------------------------<  105<H>  4.1   |  23  |  1.0    Ca    8.4<L>      09 Apr 2020 07:04  Mg     2.4     04-09    TPro  6.5  /  Alb  3.0<L>  /  TBili  0.4  /  DBili  x   /  AST  39  /  ALT  25  /  AlkPhos  59  04-09              COVID-19 PCR: Detected (31 Mar 2020 20:20)      RADIOLOGY & ADDITIONAL TESTS:  Imaging from Last 24 Hours:    Electrocardiogram/QTc Interval:    COORDINATION OF CARE:  Care Discussed with Consultants/Other Providers: Medicine Progress Note    Patient is a 69y old  Female who presents with a chief complaint of weakness (09 Apr 2020 19:26)      SUBJECTIVE / OVERNIGHT EVENTS:  (Communicated in pt's primary language of Sierra Leonean) Pt reports, coughing less, feeling better, and not hungry this morning.  Otherwise, no complaints, denies CP, SOB, and palpitations.      ADDITIONAL REVIEW OF SYSTEMS:    MEDICATIONS  (STANDING):  atorvastatin 20 milliGRAM(s) Oral at bedtime  chlorhexidine 4% Liquid 1 Application(s) Topical <User Schedule>  dextrose 5% + sodium chloride 0.45%. 1000 milliLiter(s) (75 mL/Hr) IV Continuous <Continuous>  enoxaparin Injectable 70 milliGRAM(s) SubCutaneous every 12 hours  hydroxychloroquine   Oral   hydroxychloroquine 200 milliGRAM(s) Oral every 12 hours  pantoprazole  Injectable 40 milliGRAM(s) IV Push daily    MEDICATIONS  (PRN):  acetaminophen   Tablet .. 650 milliGRAM(s) Oral every 6 hours PRN Mild Pain (1 - 3)  acetaminophen   Tablet .. 650 milliGRAM(s) Oral every 6 hours PRN Temp greater or equal to 38C (100.4F)    CAPILLARY BLOOD GLUCOSE        I&O's Summary    09 Apr 2020 07:01  -  10 Apr 2020 07:00  --------------------------------------------------------  IN: 0 mL / OUT: 500 mL / NET: -500 mL        PHYSICAL EXAM:  Vital Signs Last 24 Hrs  T(C): 37.1 (10 Apr 2020 04:49), Max: 37.6 (09 Apr 2020 16:07)  T(F): 98.7 (10 Apr 2020 04:49), Max: 99.7 (09 Apr 2020 16:07)  HR: 80 (10 Apr 2020 04:49) (70 - 86)  BP: 119/61 (10 Apr 2020 04:49) (114/56 - 144/74)  BP(mean): --  RR: 22 (10 Apr 2020 04:49) (18 - 34)  SpO2: 90% (10 Apr 2020 04:49) (90% - 96%)  CONSTITUTIONAL: NAD, well-developed, well-groomed  ENMT: Moist oral mucosa, no pharyngeal injection or exudates; normal dentition  RESPIRATORY: Normal respiratory effort; lungs are decreased to auscultation bilaterally  CARDIOVASCULAR: Regular rate and rhythm, normal S1 and S2, no murmur/rub/gallop; No lower extremity edema; Peripheral pulses are 2+ bilaterally  ABDOMEN: Nontender to palpation, normoactive bowel sounds, no rebound/guarding; No hepatosplenomegaly  PSYCH: A+O to person, place, and time; affect appropriate  NEUROLOGY: CN 2-12 are intact and symmetric; no gross sensory deficits   SKIN: No rashes; no palpable lesions    LABS:                        12.1   7.77  )-----------( 282      ( 09 Apr 2020 07:04 )             36.1     04-09    137  |  98  |  18  ----------------------------<  105<H>  4.1   |  23  |  1.0    Ca    8.4<L>      09 Apr 2020 07:04  Mg     2.4     04-09    TPro  6.5  /  Alb  3.0<L>  /  TBili  0.4  /  DBili  x   /  AST  39  /  ALT  25  /  AlkPhos  59  04-09              COVID-19 PCR: Detected (31 Mar 2020 20:20)      RADIOLOGY & ADDITIONAL TESTS:  Imaging from Last 24 Hours:    Electrocardiogram/QTc Interval:    COORDINATION OF CARE:  Care Discussed with Consultants/Other Providers: Medicine Progress Note    Patient is a 69y old  Female who presents with a chief complaint of weakness (09 Apr 2020 19:26)    SUBJECTIVE / OVERNIGHT EVENTS:  (Communicated in pt's primary language of Georgian) Pt reports, coughing less, feeling better, and not hungry this morning.  Otherwise, no complaints, denies CP, SOB, and palpitations.      ADDITIONAL REVIEW OF SYSTEMS:    MEDICATIONS  (STANDING):  atorvastatin 20 milliGRAM(s) Oral at bedtime  chlorhexidine 4% Liquid 1 Application(s) Topical <User Schedule>  dextrose 5% + sodium chloride 0.45%. 1000 milliLiter(s) (75 mL/Hr) IV Continuous <Continuous>  enoxaparin Injectable 70 milliGRAM(s) SubCutaneous every 12 hours  hydroxychloroquine   Oral   hydroxychloroquine 200 milliGRAM(s) Oral every 12 hours  pantoprazole  Injectable 40 milliGRAM(s) IV Push daily    MEDICATIONS  (PRN):  acetaminophen   Tablet .. 650 milliGRAM(s) Oral every 6 hours PRN Mild Pain (1 - 3)  acetaminophen   Tablet .. 650 milliGRAM(s) Oral every 6 hours PRN Temp greater or equal to 38C (100.4F)    CAPILLARY BLOOD GLUCOSE        I&O's Summary    09 Apr 2020 07:01  -  10 Apr 2020 07:00  --------------------------------------------------------  IN: 0 mL / OUT: 500 mL / NET: -500 mL        PHYSICAL EXAM:  Vital Signs Last 24 Hrs  T(C): 37.1 (10 Apr 2020 04:49), Max: 37.6 (09 Apr 2020 16:07)  T(F): 98.7 (10 Apr 2020 04:49), Max: 99.7 (09 Apr 2020 16:07)  HR: 80 (10 Apr 2020 04:49) (70 - 86)  BP: 119/61 (10 Apr 2020 04:49) (114/56 - 144/74)  BP(mean): --  RR: 22 (10 Apr 2020 04:49) (18 - 34)  SpO2: 90% (10 Apr 2020 04:49) (90% - 96%)  CONSTITUTIONAL: NAD, well-developed, well-groomed  ENMT: Moist oral mucosa, no pharyngeal injection or exudates; normal dentition  RESPIRATORY: Normal respiratory effort; lungs are decreased to auscultation bilaterally  CARDIOVASCULAR: Regular rate and rhythm, normal S1 and S2, no murmur/rub/gallop; No lower extremity edema; Peripheral pulses are 2+ bilaterally  ABDOMEN: Nontender to palpation, normoactive bowel sounds, no rebound/guarding; No hepatosplenomegaly  PSYCH: A+O to person, place, and time; affect appropriate  NEUROLOGY: CN 2-12 are intact and symmetric; no gross sensory deficits   SKIN: No rashes; no palpable lesions    LABS:                        12.1   7.77  )-----------( 282      ( 09 Apr 2020 07:04 )             36.1     04-09    137  |  98  |  18  ----------------------------<  105<H>  4.1   |  23  |  1.0    Ca    8.4<L>      09 Apr 2020 07:04  Mg     2.4     04-09    TPro  6.5  /  Alb  3.0<L>  /  TBili  0.4  /  DBili  x   /  AST  39  /  ALT  25  /  AlkPhos  59  04-09              COVID-19 PCR: Detected (31 Mar 2020 20:20)      RADIOLOGY & ADDITIONAL TESTS:  Imaging from Last 24 Hours:    Electrocardiogram/QTc Interval:    COORDINATION OF CARE:  Care Discussed with Consultants/Other Providers: Medicine Progress Note    Patient is a 69y old  Female who presents with a chief complaint of weakness (09 Apr 2020 19:26)    SUBJECTIVE / OVERNIGHT EVENTS:  (Communicated in pt's primary language of German) Pt reports, coughing less, feeling better, and not hungry this morning.  Otherwise, no complaints, denies CP, SOB, and palpitations.      MEDICATIONS  (STANDING):  atorvastatin 20 milliGRAM(s) Oral at bedtime  chlorhexidine 4% Liquid 1 Application(s) Topical <User Schedule>  dextrose 5% + sodium chloride 0.45%. 1000 milliLiter(s) (75 mL/Hr) IV Continuous <Continuous>  enoxaparin Injectable 70 milliGRAM(s) SubCutaneous every 12 hours  hydroxychloroquine   Oral   hydroxychloroquine 200 milliGRAM(s) Oral every 12 hours  pantoprazole  Injectable 40 milliGRAM(s) IV Push daily    MEDICATIONS  (PRN):  acetaminophen   Tablet .. 650 milliGRAM(s) Oral every 6 hours PRN Mild Pain (1 - 3)  acetaminophen   Tablet .. 650 milliGRAM(s) Oral every 6 hours PRN Temp greater or equal to 38C (100.4F)      PHYSICAL EXAM:  Vital Signs Last 24 Hrs  T(C): 37.1 (10 Apr 2020 04:49), Max: 37.6 (09 Apr 2020 16:07)  T(F): 98.7 (10 Apr 2020 04:49), Max: 99.7 (09 Apr 2020 16:07)  HR: 80 (10 Apr 2020 04:49) (70 - 86)  BP: 119/61 (10 Apr 2020 04:49) (114/56 - 144/74)  BP(mean): --  RR: 22 (10 Apr 2020 04:49) (18 - 34)  SpO2: 90% (10 Apr 2020 04:49) (90% - 96%)  CONSTITUTIONAL: NAD, well-developed, well-groomed  ENMT: Moist oral mucosa, no pharyngeal injection or exudates; normal dentition  RESPIRATORY: Normal respiratory effort; lungs are decreased to auscultation bilaterally  CARDIOVASCULAR: Regular rate and rhythm, normal S1 and S2, no murmur/rub/gallop; No lower extremity edema; Peripheral pulses are 2+ bilaterally  ABDOMEN: Nontender to palpation, normoactive bowel sounds, no rebound/guarding; No hepatosplenomegaly  PSYCH: A+O to person, place, and time; affect appropriate  NEUROLOGY: CN 2-12 are intact and symmetric; no gross sensory deficits   SKIN: No rashes; no palpable lesions    LABS:                        12.1   7.77  )-----------( 282      ( 09 Apr 2020 07:04 )             36.1     04-09    137  |  98  |  18  ----------------------------<  105<H>  4.1   |  23  |  1.0    Ca    8.4<L>      09 Apr 2020 07:04  Mg     2.4     04-09    TPro  6.5  /  Alb  3.0<L>  /  TBili  0.4  /  DBili  x   /  AST  39  /  ALT  25  /  AlkPhos  59  04-09      COVID-19 PCR: Detected (31 Mar 2020 20:20)      RADIOLOGY & ADDITIONAL TESTS:  Imaging from Last 24 Hours:    Electrocardiogram/QTc Interval:    COORDINATION OF CARE:  Care Discussed with Consultants/Other Providers:

## 2020-04-10 NOTE — PROGRESS NOTE ADULT - SUBJECTIVE AND OBJECTIVE BOX
Medicine Progress Note    Patient is a 69y old  Female who presents with a chief complaint of weakness (09 Apr 2020 19:26)      SUBJECTIVE / OVERNIGHT EVENTS:    ADDITIONAL REVIEW OF SYSTEMS:    MEDICATIONS  (STANDING):  atorvastatin 20 milliGRAM(s) Oral at bedtime  chlorhexidine 4% Liquid 1 Application(s) Topical <User Schedule>  dextrose 5% + sodium chloride 0.45%. 1000 milliLiter(s) (75 mL/Hr) IV Continuous <Continuous>  enoxaparin Injectable 70 milliGRAM(s) SubCutaneous every 12 hours  hydroxychloroquine   Oral   hydroxychloroquine 200 milliGRAM(s) Oral every 12 hours  pantoprazole  Injectable 40 milliGRAM(s) IV Push daily    MEDICATIONS  (PRN):  acetaminophen   Tablet .. 650 milliGRAM(s) Oral every 6 hours PRN Mild Pain (1 - 3)  acetaminophen   Tablet .. 650 milliGRAM(s) Oral every 6 hours PRN Temp greater or equal to 38C (100.4F)    CAPILLARY BLOOD GLUCOSE        I&O's Summary    09 Apr 2020 07:01  -  10 Apr 2020 07:00  --------------------------------------------------------  IN: 0 mL / OUT: 500 mL / NET: -500 mL        PHYSICAL EXAM:  Vital Signs Last 24 Hrs  T(C): 37.1 (10 Apr 2020 04:49), Max: 37.6 (09 Apr 2020 16:07)  T(F): 98.7 (10 Apr 2020 04:49), Max: 99.7 (09 Apr 2020 16:07)  HR: 80 (10 Apr 2020 04:49) (70 - 86)  BP: 119/61 (10 Apr 2020 04:49) (114/56 - 144/74)  BP(mean): --  RR: 22 (10 Apr 2020 04:49) (18 - 34)  SpO2: 90% (10 Apr 2020 04:49) (90% - 96%)  CONSTITUTIONAL: NAD, well-developed, well-groomed  ENMT: Moist oral mucosa, no pharyngeal injection or exudates; normal dentition  RESPIRATORY: Normal respiratory effort; lungs are clear to auscultation bilaterally  CARDIOVASCULAR: Regular rate and rhythm, normal S1 and S2, no murmur/rub/gallop; No lower extremity edema; Peripheral pulses are 2+ bilaterally  ABDOMEN: Nontender to palpation, normoactive bowel sounds, no rebound/guarding; No hepatosplenomegaly  PSYCH: A+O to person, place, and time; affect appropriate  NEUROLOGY: CN 2-12 are intact and symmetric; no gross sensory deficits   SKIN: No rashes; no palpable lesions    LABS:                        12.1   7.77  )-----------( 282      ( 09 Apr 2020 07:04 )             36.1     04-09    137  |  98  |  18  ----------------------------<  105<H>  4.1   |  23  |  1.0    Ca    8.4<L>      09 Apr 2020 07:04  Mg     2.4     04-09    TPro  6.5  /  Alb  3.0<L>  /  TBili  0.4  /  DBili  x   /  AST  39  /  ALT  25  /  AlkPhos  59  04-09              COVID-19 PCR: Detected (31 Mar 2020 20:20)      RADIOLOGY & ADDITIONAL TESTS:  Imaging from Last 24 Hours:    Electrocardiogram/QTc Interval:    COORDINATION OF CARE:  Care Discussed with Consultants/Other Providers:

## 2020-04-10 NOTE — PHYSICAL THERAPY INITIAL EVALUATION ADULT - GENERAL OBSERVATIONS, REHAB EVAL
10:45-11:30; Pt encountered in bed, + O2; agreeable to PT. Attempted use of [facility]  #[id number].  with difficulty understanding pt.

## 2020-04-11 LAB
BASOPHILS # BLD AUTO: 0.01 K/UL — SIGNIFICANT CHANGE UP (ref 0–0.2)
BASOPHILS NFR BLD AUTO: 0.2 % — SIGNIFICANT CHANGE UP (ref 0–1)
D DIMER BLD IA.RAPID-MCNC: 799 NG/ML DDU — HIGH (ref 0–230)
EOSINOPHIL # BLD AUTO: 0.05 K/UL — SIGNIFICANT CHANGE UP (ref 0–0.7)
EOSINOPHIL NFR BLD AUTO: 0.8 % — SIGNIFICANT CHANGE UP (ref 0–8)
HCT VFR BLD CALC: 35.4 % — LOW (ref 37–47)
HGB BLD-MCNC: 12.2 G/DL — SIGNIFICANT CHANGE UP (ref 12–16)
IMM GRANULOCYTES NFR BLD AUTO: 0.8 % — HIGH (ref 0.1–0.3)
LYMPHOCYTES # BLD AUTO: 1.16 K/UL — LOW (ref 1.2–3.4)
LYMPHOCYTES # BLD AUTO: 19.2 % — LOW (ref 20.5–51.1)
MCHC RBC-ENTMCNC: 31.9 PG — HIGH (ref 27–31)
MCHC RBC-ENTMCNC: 34.5 G/DL — SIGNIFICANT CHANGE UP (ref 32–37)
MCV RBC AUTO: 92.7 FL — SIGNIFICANT CHANGE UP (ref 81–99)
MONOCYTES # BLD AUTO: 0.73 K/UL — HIGH (ref 0.1–0.6)
MONOCYTES NFR BLD AUTO: 12.1 % — HIGH (ref 1.7–9.3)
NEUTROPHILS # BLD AUTO: 4.03 K/UL — SIGNIFICANT CHANGE UP (ref 1.4–6.5)
NEUTROPHILS NFR BLD AUTO: 66.9 % — SIGNIFICANT CHANGE UP (ref 42.2–75.2)
NRBC # BLD: 0 /100 WBCS — SIGNIFICANT CHANGE UP (ref 0–0)
PLATELET # BLD AUTO: 355 K/UL — SIGNIFICANT CHANGE UP (ref 130–400)
RBC # BLD: 3.82 M/UL — LOW (ref 4.2–5.4)
RBC # FLD: 12.8 % — SIGNIFICANT CHANGE UP (ref 11.5–14.5)
WBC # BLD: 6.03 K/UL — SIGNIFICANT CHANGE UP (ref 4.8–10.8)
WBC # FLD AUTO: 6.03 K/UL — SIGNIFICANT CHANGE UP (ref 4.8–10.8)

## 2020-04-11 RX ORDER — SODIUM CHLORIDE 9 MG/ML
250 INJECTION INTRAMUSCULAR; INTRAVENOUS; SUBCUTANEOUS
Refills: 0 | Status: DISCONTINUED | OUTPATIENT
Start: 2020-04-11 | End: 2020-04-13

## 2020-04-11 RX ORDER — ZINC SULFATE TAB 220 MG (50 MG ZINC EQUIVALENT) 220 (50 ZN) MG
220 TAB ORAL DAILY
Refills: 0 | Status: DISCONTINUED | OUTPATIENT
Start: 2020-04-11 | End: 2020-04-13

## 2020-04-11 RX ORDER — METOCLOPRAMIDE HCL 10 MG
10 TABLET ORAL ONCE
Refills: 0 | Status: COMPLETED | OUTPATIENT
Start: 2020-04-11 | End: 2020-04-11

## 2020-04-11 RX ORDER — ASCORBIC ACID 60 MG
500 TABLET,CHEWABLE ORAL
Refills: 0 | Status: DISCONTINUED | OUTPATIENT
Start: 2020-04-11 | End: 2020-04-13

## 2020-04-11 RX ORDER — SODIUM CHLORIDE 9 MG/ML
250 INJECTION INTRAMUSCULAR; INTRAVENOUS; SUBCUTANEOUS
Refills: 0 | Status: DISCONTINUED | OUTPATIENT
Start: 2020-04-11 | End: 2020-04-11

## 2020-04-11 RX ADMIN — ENOXAPARIN SODIUM 70 MILLIGRAM(S): 100 INJECTION SUBCUTANEOUS at 04:33

## 2020-04-11 RX ADMIN — Medication 10 MILLIGRAM(S): at 21:01

## 2020-04-11 RX ADMIN — Medication 200 MILLIGRAM(S): at 04:34

## 2020-04-11 RX ADMIN — PANTOPRAZOLE SODIUM 40 MILLIGRAM(S): 20 TABLET, DELAYED RELEASE ORAL at 18:22

## 2020-04-11 RX ADMIN — Medication 650 MILLIGRAM(S): at 04:35

## 2020-04-11 RX ADMIN — ENOXAPARIN SODIUM 70 MILLIGRAM(S): 100 INJECTION SUBCUTANEOUS at 18:21

## 2020-04-11 RX ADMIN — Medication 500 MILLIGRAM(S): at 18:20

## 2020-04-11 RX ADMIN — SODIUM CHLORIDE 250 MILLILITER(S): 9 INJECTION INTRAMUSCULAR; INTRAVENOUS; SUBCUTANEOUS at 21:02

## 2020-04-11 NOTE — PROGRESS NOTE ADULT - ATTENDING COMMENTS
preceding noted  discussed on team rounds  feels better  Incentive spirometry encouraged  overall feels better  Denies any n/v  anticipate d/c today or tomorrow

## 2020-04-11 NOTE — PROGRESS NOTE ADULT - SUBJECTIVE AND OBJECTIVE BOX
Medicine Progress Note    Patient is a 69y old  Female who presents with a chief complaint of Hypoxia secondary to COVID19 infection (10 Apr 2020 07:42)      SUBJECTIVE / OVERNIGHT EVENTS:    ADDITIONAL REVIEW OF SYSTEMS:    MEDICATIONS  (STANDING):  atorvastatin 20 milliGRAM(s) Oral at bedtime  chlorhexidine 4% Liquid 1 Application(s) Topical <User Schedule>  dextrose 5% + sodium chloride 0.45%. 1000 milliLiter(s) (75 mL/Hr) IV Continuous <Continuous>  enoxaparin Injectable 70 milliGRAM(s) SubCutaneous every 12 hours  hydroxychloroquine   Oral   hydroxychloroquine 200 milliGRAM(s) Oral every 12 hours  pantoprazole  Injectable 40 milliGRAM(s) IV Push daily    MEDICATIONS  (PRN):  acetaminophen   Tablet .. 650 milliGRAM(s) Oral every 6 hours PRN Mild Pain (1 - 3)  acetaminophen   Tablet .. 650 milliGRAM(s) Oral every 6 hours PRN Temp greater or equal to 38C (100.4F)    CAPILLARY BLOOD GLUCOSE        I&O's Summary      PHYSICAL EXAM:  Vital Signs Last 24 Hrs  T(C): 36.7 (11 Apr 2020 04:28), Max: 36.7 (11 Apr 2020 00:00)  T(F): 98 (11 Apr 2020 04:28), Max: 98 (11 Apr 2020 00:00)  HR: 84 (11 Apr 2020 09:59) (70 - 84)  BP: 116/58 (11 Apr 2020 04:28) (108/58 - 121/61)  BP(mean): --  RR: 26 (11 Apr 2020 09:59) (18 - 26)  SpO2: 92% (11 Apr 2020 09:59) (90% - 96%)    Gen: Not in acute distress, speaks in full sentences  HEENT: Moist mucus membranes  CV: S1S2 present, no murmurs  Resp, Clear to auscultation bilaterally with crackles at bases  Abd: Soft, nontender  Ext: No LE edema or calf tenderness bilaterally  Skin: Warm and dry  Neuro: No gross focal deficits      COVID-19 PCR: Detected (31 Mar 2020 20:20)      RADIOLOGY & ADDITIONAL TESTS:  Imaging from Last 24 Hours:    Electrocardiogram/QTc Interval:    COORDINATION OF CARE:  Care Discussed with Consultants/Other Providers: Medicine Progress Note    Patient is a 69y old  Female who presents with a chief complaint of Hypoxia secondary to COVID19 infection (10 Apr 2020 07:42)      SUBJECTIVE / OVERNIGHT EVENTS:  Pt reports, feeling better this morning, having breakfast, no complaints.  Headache resolved.  Incentive Spirometer provided . Pt continues on 2 L NC.    MEDICATIONS  (STANDING):  atorvastatin 20 milliGRAM(s) Oral at bedtime  chlorhexidine 4% Liquid 1 Application(s) Topical <User Schedule>  dextrose 5% + sodium chloride 0.45%. 1000 milliLiter(s) (75 mL/Hr) IV Continuous <Continuous>  enoxaparin Injectable 70 milliGRAM(s) SubCutaneous every 12 hours  hydroxychloroquine   Oral   hydroxychloroquine 200 milliGRAM(s) Oral every 12 hours  pantoprazole  Injectable 40 milliGRAM(s) IV Push daily    MEDICATIONS  (PRN):  acetaminophen   Tablet .. 650 milliGRAM(s) Oral every 6 hours PRN Mild Pain (1 - 3)  acetaminophen   Tablet .. 650 milliGRAM(s) Oral every 6 hours PRN Temp greater or equal to 38C (100.4F)    PHYSICAL EXAM:  Vital Signs Last 24 Hrs  T(C): 36.7 (11 Apr 2020 04:28), Max: 36.7 (11 Apr 2020 00:00)  T(F): 98 (11 Apr 2020 04:28), Max: 98 (11 Apr 2020 00:00)  HR: 84 (11 Apr 2020 09:59) (70 - 84)  BP: 116/58 (11 Apr 2020 04:28) (108/58 - 121/61)  BP(mean): --  RR: 26 (11 Apr 2020 09:59) (18 - 26)  SpO2: 92% (11 Apr 2020 09:59) (90% - 96%)    Gen: Not in acute distress, speaks in full sentences  HEENT: Moist mucus membranes  CV: S1S2 present, no murmurs  Resp, Clear to auscultation bilaterally with crackles at bases  Abd: Soft, nontender  Ext: No LE edema or calf tenderness bilaterally  Skin: Warm and dry  Neuro: No gross focal deficits      COVID-19 PCR: Detected (31 Mar 2020 20:20)

## 2020-04-11 NOTE — PROGRESS NOTE ADULT - ASSESSMENT
68yo F with pmh HTN, HLD, and GERD was admitted for hypoxia secondary to COVID19 infection on 4/6/2020 (positive for COVID19 on 3/31/2020). Patient was transferred from [facility] to [facility] on 4/10/2020 because she had low SaO2 on room air and needed to continue oxygen by nasal cannula at 2L/min. Patient reports feeling better today, and slightly stronger. Encouraged PO intake, provided Ensure. No chest pain, fevers, or difficulty breathing. Tolerating nasal cannula oxygen at 2L/min.    Assessment/plan  1) COVID19 infection  -  Goal SaO2>90%. Wean off nasal cannula as tolerated.  - Monitor for fevers and signs of respiratory distress  - S/p azithromycin 4/6/2020 to 4/7/2020 (not continued afterwards)  - Hydroxychloroquine started 4/6/2020, to be completed on 4/10/2020  - Increase activity level as tolerated    2) HTN  - Stable at this time  - Enalapril held due to hyperkalemia on admission; electrolytes stable at this time  - If SBP >140mmHg, consider starting Amlodipine 5mg daily    3) HLD  - Atorvastatin 20mg daily  - Low salt, low cholesterol diet    4) Diet  - Added Ensure supplemental nutrition to diet   - Encouraged PO intake     5) D/C Planing   - anticipate D/C home

## 2020-04-12 LAB
ALBUMIN SERPL ELPH-MCNC: 3.1 G/DL — LOW (ref 3.5–5.2)
ALP SERPL-CCNC: 78 U/L — SIGNIFICANT CHANGE UP (ref 30–115)
ALT FLD-CCNC: 77 U/L — HIGH (ref 0–41)
ANION GAP SERPL CALC-SCNC: 15 MMOL/L — HIGH (ref 7–14)
AST SERPL-CCNC: 123 U/L — HIGH (ref 0–41)
BILIRUB SERPL-MCNC: 0.4 MG/DL — SIGNIFICANT CHANGE UP (ref 0.2–1.2)
BUN SERPL-MCNC: 20 MG/DL — SIGNIFICANT CHANGE UP (ref 10–20)
CALCIUM SERPL-MCNC: 8.3 MG/DL — LOW (ref 8.5–10.1)
CHLORIDE SERPL-SCNC: 102 MMOL/L — SIGNIFICANT CHANGE UP (ref 98–110)
CO2 SERPL-SCNC: 21 MMOL/L — SIGNIFICANT CHANGE UP (ref 17–32)
CREAT SERPL-MCNC: 0.9 MG/DL — SIGNIFICANT CHANGE UP (ref 0.7–1.5)
GLUCOSE SERPL-MCNC: 91 MG/DL — SIGNIFICANT CHANGE UP (ref 70–99)
POTASSIUM SERPL-MCNC: 5.1 MMOL/L — HIGH (ref 3.5–5)
POTASSIUM SERPL-SCNC: 5.1 MMOL/L — HIGH (ref 3.5–5)
PROT SERPL-MCNC: 6.7 G/DL — SIGNIFICANT CHANGE UP (ref 6–8)
SODIUM SERPL-SCNC: 138 MMOL/L — SIGNIFICANT CHANGE UP (ref 135–146)

## 2020-04-12 RX ADMIN — ATORVASTATIN CALCIUM 20 MILLIGRAM(S): 80 TABLET, FILM COATED ORAL at 23:49

## 2020-04-12 RX ADMIN — ENOXAPARIN SODIUM 70 MILLIGRAM(S): 100 INJECTION SUBCUTANEOUS at 18:00

## 2020-04-12 RX ADMIN — ENOXAPARIN SODIUM 70 MILLIGRAM(S): 100 INJECTION SUBCUTANEOUS at 05:52

## 2020-04-12 RX ADMIN — ZINC SULFATE TAB 220 MG (50 MG ZINC EQUIVALENT) 220 MILLIGRAM(S): 220 (50 ZN) TAB at 18:00

## 2020-04-12 RX ADMIN — PANTOPRAZOLE SODIUM 40 MILLIGRAM(S): 20 TABLET, DELAYED RELEASE ORAL at 18:01

## 2020-04-12 RX ADMIN — Medication 650 MILLIGRAM(S): at 23:50

## 2020-04-12 RX ADMIN — Medication 500 MILLIGRAM(S): at 05:51

## 2020-04-12 RX ADMIN — Medication 500 MILLIGRAM(S): at 17:59

## 2020-04-12 NOTE — PROGRESS NOTE ADULT - SUBJECTIVE AND OBJECTIVE BOX
S: Patient reports feeling better at this time. Has been using nasal cannula at 2L/min and tolerating it well. No fevers, chest pains, or difficulty breathing.    O: Physical exam  Gen: Not in acute distress, speaking in full sentences  CV: S1S2 present, RRR  Resp: Clear to auscultation bilaterally with mild crackles at bases  Neuro: No gross focal deficits  Skin: Warm and dry

## 2020-04-12 NOTE — PROGRESS NOTE ADULT - ASSESSMENT
70yo F with pmh HTN, HLD, and GERD was admitted for hypoxia secondary to COVID19 infection on 4/6/2020 (positive for COVID19 on 3/31/2020). Patient was transferred from [facility] to [facility] on 4/10/2020 because she had low SaO2 on room air and needed to continue oxygen by nasal cannula at 2L/min. Patient's condition is improving and tolerating lower concentrations of supplemental oxygen by nasal cannula    Assessment/plan  1) COVID19 infection  -  Goal SaO2>90%. Discontinue nasal cannula, trial of activity on room air.   - Monitor for fevers and signs of respiratory distress  - S/p azithromycin 4/6/2020 to 4/7/2020 (not continued afterwards)  - S/p Hydroxychloroquine 4/6/2020 to 4/10/2020    2) HTN  - Stable at this time  - Enalapril held due to hyperkalemia on admission; electrolytes stable at this time  - If SBP >140mmHg, consider starting Amlodipine 5mg daily    3) HLD  - Atorvastatin 20mg daily  - Low salt, low cholesterol diet    4) Diet  - Low sodium diet with Ensure for supplemental nutrition    5) DVT ppx  - Enoxaparin 70mg q12h  - Discharge with Xarelto 10mg daily as age >60 and D-dimer 799 (admission D-dimer was 7015)

## 2020-04-13 ENCOUNTER — TRANSCRIPTION ENCOUNTER (OUTPATIENT)
Age: 70
End: 2020-04-13

## 2020-04-13 VITALS
DIASTOLIC BLOOD PRESSURE: 73 MMHG | RESPIRATION RATE: 19 BRPM | HEART RATE: 73 BPM | OXYGEN SATURATION: 91 % | SYSTOLIC BLOOD PRESSURE: 134 MMHG | TEMPERATURE: 97 F

## 2020-04-13 LAB
CRP SERPL-MCNC: 4.48 MG/DL — HIGH (ref 0–0.4)
PROCALCITONIN SERPL-MCNC: 0.09 NG/ML — SIGNIFICANT CHANGE UP (ref 0.02–0.1)

## 2020-04-13 RX ORDER — RIVAROXABAN 15 MG-20MG
1 KIT ORAL
Qty: 30 | Refills: 0
Start: 2020-04-13 | End: 2020-05-12

## 2020-04-13 RX ORDER — ZINC SULFATE TAB 220 MG (50 MG ZINC EQUIVALENT) 220 (50 ZN) MG
1 TAB ORAL
Qty: 30 | Refills: 0
Start: 2020-04-13 | End: 2020-05-12

## 2020-04-13 RX ORDER — ASCORBIC ACID 60 MG
1 TABLET,CHEWABLE ORAL
Qty: 60 | Refills: 0
Start: 2020-04-13 | End: 2020-05-12

## 2020-04-13 RX ADMIN — ZINC SULFATE TAB 220 MG (50 MG ZINC EQUIVALENT) 220 MILLIGRAM(S): 220 (50 ZN) TAB at 11:28

## 2020-04-13 RX ADMIN — ENOXAPARIN SODIUM 70 MILLIGRAM(S): 100 INJECTION SUBCUTANEOUS at 05:57

## 2020-04-13 RX ADMIN — Medication 500 MILLIGRAM(S): at 07:31

## 2020-04-13 NOTE — PROGRESS NOTE ADULT - ASSESSMENT
68yo F with pmh HTN, HLD, and GERD was admitted for hypoxia secondary to COVID19 infection on 4/6/2020 (positive for COVID19 on 3/31/2020). Patient was transferred from [facility] to [facility] on 4/10/2020 because she had low SaO2 on room air and needed to continue oxygen by nasal cannula at 2L/min. Patient's condition is improving and tolerating lower concentrations of supplemental oxygen by nasal cannula    Assessment/plan  1) COVID19 infection  Weaned off oxygen, ambulating without assistance, no distress noted  - S/p azithromycin 4/6/2020 to 4/7/2020 (not continued afterwards)  - S/p Hydroxychloroquine 4/6/2020 to 4/10/2020    2) HTN  - Stable at this time  - Enalapril held due to hyperkalemia on admission; electrolytes stable at this time  - If SBP >140mmHg, consider starting Amlodipine 5mg daily    3) HLD  - Atorvastatin 20mg daily  - Low salt, low cholesterol diet    4) Diet  - Low sodium diet with Ensure for supplemental nutrition    5) DVT ppx  - Enoxaparin 70mg q12h  - Discharge today with Xarelto 10mg daily as age >60 and D-dimer 799 (admission D-dimer was 7015), spoke with daughter ([phone]) regarding discharge plan, and will come topick her up.

## 2020-04-13 NOTE — DISCHARGE NOTE NURSING/CASE MANAGEMENT/SOCIAL WORK - NSDCPEXARELTO_GEN_ALL_CORE
Rivaroxaban/Xarelto - Follow up monitoring/Rivaroxaban/Xarelto - Dietary Advice/Rivaroxaban/Xarelto - Compliance/Rivaroxaban/Xarelto - Potential for adverse drug reactions and interactions

## 2020-04-13 NOTE — PROGRESS NOTE ADULT - SUBJECTIVE AND OBJECTIVE BOX
Medicine Progress Note    Patient is a 69y old  Female who presents with a chief complaint of SOB, abdominal pain, fever, diagnosed with COVID19 infection (12 Apr 2020 17:09)      SUBJECTIVE / OVERNIGHT EVENTS: No overnight events, remains stable, today (4/13) ambulated on room air and maintained oxygen levels 89-92%    ADDITIONAL REVIEW OF SYSTEMS:    MEDICATIONS  (STANDING):  ascorbic acid 500 milliGRAM(s) Oral two times a day  atorvastatin 20 milliGRAM(s) Oral at bedtime  chlorhexidine 4% Liquid 1 Application(s) Topical <User Schedule>  enoxaparin Injectable 70 milliGRAM(s) SubCutaneous every 12 hours  hydroxychloroquine   Oral   hydroxychloroquine 200 milliGRAM(s) Oral every 12 hours  pantoprazole  Injectable 40 milliGRAM(s) IV Push daily  sodium chloride 0.9%. 250 milliLiter(s) (250 mL/Hr) IV Continuous <Continuous>  zinc sulfate 220 milliGRAM(s) Oral daily    MEDICATIONS  (PRN):  acetaminophen   Tablet .. 650 milliGRAM(s) Oral every 6 hours PRN Mild Pain (1 - 3)  acetaminophen   Tablet .. 650 milliGRAM(s) Oral every 6 hours PRN Temp greater or equal to 38C (100.4F)    CAPILLARY BLOOD GLUCOSE        I&O's Summary      PHYSICAL EXAM:  Vital Signs Last 24 Hrs  T(C): 36.3 (13 Apr 2020 12:18), Max: 37.1 (12 Apr 2020 20:18)  T(F): 97.4 (13 Apr 2020 12:18), Max: 98.7 (12 Apr 2020 20:18)  HR: 73 (13 Apr 2020 12:18) (68 - 85)  BP: 134/73 (13 Apr 2020 12:18) (108/57 - 134/73)  BP(mean): --  RR: 19 (13 Apr 2020 12:18) (16 - 20)  SpO2: 91% (13 Apr 2020 12:18) (89% - 93%)  CONSTITUTIONAL: NAD, well-developed, well-groomed  ENMT: Moist oral mucosa, no pharyngeal injection or exudates; normal dentition  RESPIRATORY: Normal respiratory effort; bibasilar rales  CARDIOVASCULAR: Regular rate and rhythm, normal S1 and S2, no murmur/rub/gallop; No lower extremity edema; Peripheral pulses are 2+ bilaterally  ABDOMEN: Nontender to palpation, normoactive bowel sounds, no rebound/guarding; No hepatosplenomegaly  PSYCH: A+O to person, place, and time; affect appropriate  NEUROLOGY: CN 2-12 are intact and symmetric; no gross sensory deficits   SKIN: No rashes; no palpable lesions    LABS:                        12.2   6.03  )-----------( 355      ( 11 Apr 2020 20:13 )             35.4     04-12    138  |  102  |  20  ----------------------------<  91  5.1<H>   |  21  |  0.9    Ca    8.3<L>      12 Apr 2020 20:22    TPro  6.7  /  Alb  3.1<L>  /  TBili  0.4  /  DBili  x   /  AST  123<H>  /  ALT  77<H>  /  AlkPhos  78  04-12              COVID-19 PCR: Detected (31 Mar 2020 20:20)      RADIOLOGY & ADDITIONAL TESTS:  Imaging from Last 24 Hours:    Electrocardiogram/QTc Interval:    COORDINATION OF CARE:  Care Discussed with Consultants/Other Providers:

## 2020-04-13 NOTE — DISCHARGE NOTE NURSING/CASE MANAGEMENT/SOCIAL WORK - PATIENT PORTAL LINK FT
You can access the FollowMyHealth Patient Portal offered by [facility] by registering at the following website: [email or web address]. By joining [facility]’s FollowMyHealth portal, you will also be able to view your health information using other applications (apps) compatible with our system.

## 2020-09-01 ENCOUNTER — TRANSCRIPTION ENCOUNTER (OUTPATIENT)
Age: 70
End: 2020-09-01

## 2021-09-21 NOTE — ED PROVIDER NOTE - PROGRESS NOTE DETAILS
Orthopedic Spoke with patients daughter Little at length regarding pt's condition--she is comfortable with pt coming home. Everyone in household has same symptoms. Instructed need for pt to come back if symptoms worsen.   Spoke with pt regarding labs/cxr--likelihood of her having corona virus. Pt still appears comfortable, no signs of respiratory distress, o2 sat 98 on room air, no tachypnea/distress with ambulation. Will send levaquin to pharmacy, instructed to take tylenol for fever. Given very strict return precautions. Pt verbalized understanding and is agreeable to d/c.

## 2021-10-04 NOTE — CHART NOTE - NSCHARTNOTEFT_GEN_A_CORE
68 yo German speaking. female with hx of htn, hld, gerd, covid + 3/31 presented for progressing weakness and anorexia  for the last few days. She was seen in ED x2 and LLL opacity on cxr and given levaquin x5 days, initially presented with sob, cough, fever, nausea, vomiting for 10 days, cough has resolved but continues to have weakness, nausea, vomiting and now mild RLQ abdominal pain for the past 2 days. She denies cough, abdominal pain, sob  chills, cp, urinary symptoms.     Assessment & Plan:  # COVID positive on 3/31, weakness, decreased PO intake  - Pt clinically stable at this time. De-sat to 86% on room air yesterday, which improved to 92% on RA at rest. Sating 95% on 2L NC at rest.   - CT showed consolidations within the bilateral lower lobes   - Labwork significant for lymphopenia, D-dimer 7015  - C/w airborne and contact isolation. Limit healthcare professional contact  - C/w supplemental O2. Maintain SaO2>92%  - CRP = 22; Procalcitonin = 0.14; Ferritin = 972; D-dimer = 7000  - QTc: 479. Continue plaquenil.  - Continue therapeutic lovenox. Patient will be discharged on 40 mg lovenox QD (Sent to [facility] pharmacy and co-pay is $1) for 2 weeks.    # Elevated lipase  - CT abd: no sign of pancreatitis   - pt is asymptomatic now     # HTN  - hold lisinopril for hyperkalemia. Switch to amlodipine if needed    # DLD  - cw atorvastain     DIET: DASH  DVT ppx: therapeutic lovenox. Patient will be discharged on lovenox 40 mg QD for 2 weeks. (Sent to [facility] pharmacy, co-pay is $1).   GI ppx: not needed  Dispo: Acute, from home  full code     ** Routine labs not ordered for 4/10  *** Called the daughter, Amber Fitch () on 4/7, 4/8 and 4/9 and updated her on patients condition ***    Medically stable for transfer to [facility].
communicated with pt's daughter, to provide a status up date and plan of care.  Pt's family wishes to bring pt food from home, which is encouraged.  will have pt face time with family after she wakes up from her nap.
Detail Level: Detailed

## 2021-11-03 NOTE — ED ADULT NURSE NOTE - ISOLATION AIRBORNE OTHER
PGY-3 Note:   Pt seen and examined at bedside, pt now with arrest of descent despite good maternal effort.       Vital Signs Last 24 Hrs  T(F): 98.6 (2021 23:00), Max: 98.6 (2021 06:15)  HR: 154 (2021 02:35) (74 - 159)  BP: 115/- (2021 02:33) (75/44 - 179/82)  RR: 18 (2021 23:00) (18 - 18)      Physical Exam:   GA: AOX3, no apparent distress  Resp: CTAB  Cardio: RRR  Abd: soft, nontender, no palpable ctx, gravid  Extr: no erythema or pain to palpation bilaterally   SVE: 10/100/-1 per Dr. Felix     EFM: 145bpm/moderate variability/+accels/+decels   [place]: q2-4mins       A/P: 40 yo  at 37w2d GA, GBS neg, fetal macrosomia with EFW 86% and AC 94%, h/o hypothyroidism, h/o c/s x1 followed by VBACx5, TOLAC; now with failed IOL for poorly controlled pregestational diabetes vs GDMA2 with arrest of descent   -discussed risks, benefits, and alternatives of    -dicussed the risks of bleeding, infection, injury to surrounding organs and structures, and potential need for a blood transfusion   -NPO   -IVF hydration   -ancef & azithromycin   -curtis   -skin prep   -pepcid, bicitra, reglan per routine  -peds & anesthesia notified  -on call to OR        Dr. Felix at bedside
R/O COVID

## 2023-05-22 NOTE — ED ADULT NURSE NOTE - NSIMPLEMENTINTERV_GEN_ALL_ED
show Implemented All Universal Safety Interventions:  [place] to call system. Call bell, personal items and telephone within reach. Instruct patient to call for assistance. Room bathroom lighting operational. Non-slip footwear when patient is off stretcher. Physically safe environment: no spills, clutter or unnecessary equipment. Stretcher in lowest position, wheels locked, appropriate side rails in place.

## 2024-01-15 NOTE — ED ADULT NURSE NOTE - OBJECTIVE STATEMENT
negative...
Pt c/o fever, cough, SOB, and fatigue x 4 days. Breathing easy and unlabored no s/s distress observed.

## 2025-05-02 NOTE — ED ADULT TRIAGE NOTE - ISOLATION TYPE:
Pt went for a heart cath. Report given and care transferred.  
Contact precautions.../Droplet precautions.../Airborne precautions...